# Patient Record
Sex: FEMALE | Race: WHITE | Employment: UNEMPLOYED | URBAN - METROPOLITAN AREA
[De-identification: names, ages, dates, MRNs, and addresses within clinical notes are randomized per-mention and may not be internally consistent; named-entity substitution may affect disease eponyms.]

---

## 2017-03-24 ENCOUNTER — HOSPITAL ENCOUNTER (OUTPATIENT)
Dept: ULTRASOUND IMAGING | Age: 42
Discharge: HOME | End: 2017-03-24
Attending: NURSE PRACTITIONER
Payer: COMMERCIAL

## 2017-03-24 DIAGNOSIS — R79.89 ELEVATED LIVER FUNCTION TESTS: ICD-10-CM

## 2017-03-24 PROCEDURE — 76705 ECHO EXAM OF ABDOMEN: CPT

## 2017-05-05 ENCOUNTER — HOSPITAL ENCOUNTER (OUTPATIENT)
Dept: MAMMOGRAPHY | Age: 42
Discharge: HOME | End: 2017-05-05
Attending: NURSE PRACTITIONER
Payer: COMMERCIAL

## 2017-05-05 ENCOUNTER — HOSPITAL ENCOUNTER (OUTPATIENT)
Dept: NUTRITION | Age: 42
Discharge: HOME | End: 2017-05-05
Attending: NURSE PRACTITIONER
Payer: COMMERCIAL

## 2017-05-05 DIAGNOSIS — Z12.31 VISIT FOR SCREENING MAMMOGRAM: ICD-10-CM

## 2017-05-05 DIAGNOSIS — K76.0 NON-ALCOHOLIC FATTY LIVER DISEASE: ICD-10-CM

## 2017-05-05 PROCEDURE — 77067 SCR MAMMO BI INCL CAD: CPT

## 2017-05-05 PROCEDURE — 97802 MEDICAL NUTRITION INDIV IN: CPT | Performed by: DIETITIAN, REGISTERED

## 2017-05-05 NOTE — PROGRESS NOTES
1211   Assessment - Initial Nutrition Evaluation   DATE: 2017      REFERRING PHYSICIAN: Ilya Barfield NP  NAME: Brandy Salgado : 1975 AGE: 39 y.o. GENDER: female  REASON FOR VISIT: CRUZ    Past Medical Hx: Crohns (colon resection), depression    LABS:   No results found for: HBA1C, HGBE8, JDP3PZJZ, KEH9RIXL    MEDICATIONS/SUPPLEMENTS:   [unfilled]  Prior to Admission medications    Medication Sig Start Date End Date Taking? Authorizing Provider   predniSONE (STERAPRED DS) 10 mg dose pack Take as follows  Day 1: 6 tabs PO -   Day 2: 5 tabs PO -   Day 3: 4 tabs PO -   Day 4: 3 tabs PO -   Day 5: 2 tabs PO -   Day 6: 1 tab PO 5/1/15   ALESSANDRO Kincaid   budesonide-formoterol (SYMBICORT) 160-4.5 mcg/Actuation HFA inhaler Take 2 Puffs by inhalation two (2) times a day. Historical Provider   albuterol (PROAIR HFA) 90 mcg/Actuation inhaler Take 1 Puff by inhalation. Historical Provider       FOOD ALLERGIES/INTOLERANCES: None    ANTHROPOMETRICS:    Ht Readings from Last 1 Encounters:   05/01/15 5' 3\" (1.6 m)      Wt Readings from Last 1 Encounters:   05/01/15 83.8 kg (184 lb 11.9 oz)   Today's weight: 192 lbs (87.3 kg)   IBW:115 # +/- 10%  %IBW: 167% +/- 10%    BMI: 34 Category: Obesity Class I    Reported Wt Hx:  Pt states having gained weight during last 2 pregnancies (in 3 years) and not losing wt gained; also attributes wt gain last year to use of prednisone.      Estimated Nutritional Needs:   0486-8203 kcal/day (MSJ x 1.2-1.3 - 500) to promote wt loss of 1lb per week; 50-67g protein (0.6-0.8g/kg)    Reported Diet Hx:  Pt eats 2 meals a day, mostly skipping bfast. Currently eating low fat, low carbohydrate diet   24 Hour Diet Recall  Breakfast  Skips or egg, toast   snack  egg   Lunch  Salad w/tuna or chicken or no protein, vinagarette dressing   snack  Egg or fruit   Dinner  [place] or soup or salad   Snacks  fruit   Beverages  Water or occasionally gingerale Exercise/Physical Actvity:  Pt doing cardiovascular exercise at home several times a week; enjoys going to the gym    Environmental/Social:  Pt works ptime as  in her home; lives with boyfriend and 4 children (1,3,5,16 yo); pt does most of cooking. Pt is currently on [facility]. NUTRITION DIAGNOSIS:  Food and nutrition-related deficit r/t healthy eating for obesity, CRUZ as evidenced by pt seeking nutritional information r/t weight loss and diagnosis of CRUZ. NUTRITION ASSESSMENT / INTERVENTION:  Pt states concern for \"fatty liver\" (pt has CRUZ) and obesity. Pt states wanting to lose weight (goal wt 145 lbs); motivator for weight loss is to improve liver function, have more energy to be able to play with her children, and to be able to run again. Reviewed pt current diet; states following low fat, low CHO diet for several months, but indicated stress eating at times; no alcohol use. Pt states having lost 15 lbs since January by changing diet, tracking food intake and increasing exercise. Notes having \"gotten off track\" for the past couple weeks since in-law visit. Recommended choosing foods low in saturated fat, no trans fat and adding healthy fats in diet (omega 3's). Reviewed healthy plate method (1/4 plate lean protein, 1/4 complex CHO, 1/2 non-starchy vegetables. Pt currently skipping bfast most days of the week; recommended pt consume 3 meals a day with snacks between if hungry. Provided pt with Healthy Plate handout and Healthy snack handout. Discussed keeping sugar low in diet and getting consistent exercise. Pt appears to be in the action stage of change. Will follow up with RD in 2-3 weeks. PATIENT GOALS: (Pt stated)    1. Eat 3 meals a day for next 3 weeks (no skipping) using healthy plate method  2. Keep food diary of meals/snacks on [employer]  3. Snacks to consist of lean protein/carbohydrate  4.  Exercise 3-5 days a week for at least 30 minutes; go to gym 2 days per week      Specific tips and techniques to facilitate compliance with above recommendations were provided and discussed. Pt was strongly encourage to begin making necessary changes now, and re-visit the dietitian in 2-3 weeks. If further details are desired please feel free to contact me at [phone]. This phone number was also provided to the patient for any further questions or concerns.             Elijah Traore RD

## 2017-06-18 ENCOUNTER — HOSPITAL ENCOUNTER (EMERGENCY)
Age: 42
Discharge: HOME | End: 2017-06-18
Attending: EMERGENCY MEDICINE
Payer: COMMERCIAL

## 2017-06-18 VITALS
WEIGHT: 167.55 LBS | HEIGHT: 66 IN | OXYGEN SATURATION: 98 % | TEMPERATURE: 98.4 F | RESPIRATION RATE: 18 BRPM | HEART RATE: 79 BPM | BODY MASS INDEX: 26.93 KG/M2

## 2017-06-18 DIAGNOSIS — H10.33 ACUTE CONJUNCTIVITIS OF BOTH EYES, UNSPECIFIED ACUTE CONJUNCTIVITIS TYPE: Primary | ICD-10-CM

## 2017-06-18 PROCEDURE — 99283 EMERGENCY DEPT VISIT LOW MDM: CPT

## 2017-06-18 RX ORDER — OFLOXACIN 3 MG/ML
SOLUTION/ DROPS OPHTHALMIC
Qty: 5 ML | Refills: 0 | Status: SHIPPED | OUTPATIENT
Start: 2017-06-18 | End: 2017-07-08

## 2017-06-18 RX ORDER — OFLOXACIN 3 MG/ML
1 SOLUTION/ DROPS OPHTHALMIC
Status: DISCONTINUED | OUTPATIENT
Start: 2017-06-18 | End: 2017-06-18 | Stop reason: HOSPADM

## 2017-06-18 NOTE — ED PROVIDER NOTES
HPI Comments: Kulwant Mccoy is a 39 y.o. female with PMhx significant for Chron's, asthma, and depression who presents ambulatory to the ED c/o an acute onset of BL eye pain, itching, and watery discharge x yesterday. She reports associated sx of a low grade fever. The pt discloses that she has had recent sick contact with her child who has pink eye noting she believes her sx are similar to pink eye. She endorses that she has been using Saline eye drops WNR of her sx leading her to the ED for further evaluation. The pt denies any trauma to the affected area. PCP: Karissa Serrato NP      There are no other complaints, changes or physical findings at this time. Written by KALYANI Morrisibjeremy, as dictated by FRANCO Tenorio    The history is provided by the patient. No  was used. Past Medical History:   Diagnosis Date    Asthma     Crohn's disease ([address]     Depression     Environmental allergies     Gastrointestinal disorder     Crohns    Migraine     Panic attacks     Unspecified vitamin D deficiency     Vitamin B12 deficiency        Past Surgical History:   Procedure Laterality Date    ABDOMEN SURGERY PROC UNLISTED      colon resection    HX APPENDECTOMY      HX OTHER SURGICAL  1999    terminal ileum resected    HX OTHER SURGICAL  1999    bladder reconstruction due to fistula    HX UROLOGICAL      bladder         Family History:   Problem Relation Age of Onset    Diabetes Mother     Other Sister      crohns in half sister    Other Maternal Aunt      crohns    Diabetes Maternal Grandmother     Diabetes Paternal Grandmother     Diabetes Paternal Grandfather        Social History     Social History    Marital status: SINGLE     Spouse name: N/A    Number of children: N/A    Years of education: N/A     Occupational History    Not on file.      Social History Main Topics    Smoking status: Former Smoker     Quit date: 2/4/2001    Smokeless tobacco: Never Used      Comment: quit 10 years ago--less than 5 cigs/day    Alcohol use Yes      Comment: very infrequent    Drug use: No    Sexual activity: Not on file     Other Topics Concern    Not on file     Social History Narrative    ** Merged History Encounter **         Lives in [place] with her boyfriend and 15 yo daughter and 4 yo son from a previous marriage. Works as  for [employer] ([employer] Likes to go to the gym and wants to get back into running. ALLERGIES: Latex; Latex; and Other medication    Review of Systems   Constitutional: Positive for fever (low grade). HENT: Negative. Eyes: Positive for pain (BL), discharge (BL) and itching (BL). Respiratory: Negative. Cardiovascular: Negative. Gastrointestinal: Negative. Negative for constipation, diarrhea, nausea and vomiting. Denies liver disease   Endocrine:        Denies thyroid disease   Genitourinary: Negative. Negative for dysuria. Denies kidney disease   Musculoskeletal: Negative. Skin: Negative. Neurological: Negative. All other systems reviewed and are negative. Vitals:    06/18/17 1745   Pulse: 79   Resp: 18   Temp: 98.4 °F (36.9 °C)   SpO2: 98%   Weight: 76 kg (167 lb 8.8 oz)   Height: 5' 6\" (1.676 m)            Physical Exam   Constitutional: She is oriented to person, place, and time. She appears well-developed and well-nourished. No distress. HENT:   Head: Normocephalic and atraumatic. Right Ear: External ear normal.   Left Ear: External ear normal.   Nose: Nose normal.   Mouth/Throat: Oropharynx is clear and moist. No oropharyngeal exudate. Eyes: EOM are normal. Pupils are equal, round, and reactive to light. Right eye exhibits no discharge and no exudate. Left eye exhibits no discharge. Right conjunctiva is injected. Left conjunctiva is injected. No scleral icterus. Neck: Normal range of motion. Neck supple.  No tracheal deviation present. Cardiovascular: Normal rate, regular rhythm, normal heart sounds and intact distal pulses. Exam reveals no gallop and no friction rub. No murmur heard. Pulmonary/Chest: Effort normal and breath sounds normal. No respiratory distress. She has no wheezes. She has no rales. She exhibits no tenderness. Abdominal: Soft. Bowel sounds are normal. She exhibits no distension and no mass. There is no tenderness. There is no rebound and no guarding. Musculoskeletal: She exhibits no edema or tenderness. Lymphadenopathy:     She has no cervical adenopathy. Neurological: She is alert and oriented to person, place, and time. No cranial nerve deficit. Skin: Skin is warm and dry. No rash noted. No erythema. Psychiatric: She has a normal mood and affect. Her behavior is normal.   Nursing note and vitals reviewed. MDM  Number of Diagnoses or Management Options  Acute conjunctivitis of both eyes, unspecified acute conjunctivitis type:   Diagnosis management comments: DDx: Bacterial vs. Viral Conjunctivitis. Amount and/or Complexity of Data Reviewed  Review and summarize past medical records: yes    Patient Progress  Patient progress: stable    ED Course       Procedures      MEDICATIONS GIVEN:  Medications   ofloxacin (FLOXIN) 0.3 % ophthalmic solution 1 Drop (not administered)       IMPRESSION:  1. Acute conjunctivitis of both eyes, unspecified acute conjunctivitis type        PLAN:  1. Current Discharge Medication List      START taking these medications    Details   ofloxacin (FLOXIN) 0.3 % ophthalmic solution Put 1-2 drops in both eyes four times a day for five days. Qty: 5 mL, Refills: 0         CONTINUE these medications which have NOT CHANGED    Details   budesonide-formoterol (SYMBICORT) 160-4.5 mcg/Actuation HFA inhaler Take 2 Puffs by inhalation two (2) times a day. albuterol (PROAIR HFA) 90 mcg/Actuation inhaler Take 1 Puff by inhalation.            2. Follow-up Information     Follow up With Details Comments [address] [facility], [address] [facility]  [address] [address] [phone]      [facility] EMERGENCY DEPT  If symptoms worsen [address] [facility] [address]   [address] [address] [phone]        3. Return to ED if worse   Discharge Note:  6:04 PM  The patient is ready for discharge. The patient's signs, symptoms, diagnosis, and discharge instruction have been discussed and the patient has conveyed their understanding. The patient is to follow up as recommended or return to the ER should their symptoms worsen. Plan has been discussed and the patient is in agreement. Written by Addison Don ED Scribe, as dictated by FRANCO Butler    Attestation: This note is prepared by Arline Goodpasture. Barrett, acting as Scribe for Rosalinda Fernández. FRANCO Butler: The scribe's documentation has been prepared under my direction and personally reviewed by me in its entirety. I confirm that the note above accurately reflects all work, treatment, procedures, and medical decision making performed by me.

## 2017-06-18 NOTE — DISCHARGE INSTRUCTIONS
Pinkeye: Care Instructions  Your Care Instructions    Pinkeye is redness and swelling of the eye surface and the conjunctiva (the lining of the eyelid and the covering of the white part of the eye). Pinkeye is also called conjunctivitis. Pinkeye is often caused by infection with bacteria or a virus. Dry air, allergies, smoke, and chemicals are other common causes. Pinkeye often clears on its own in 7 to 10 days. Antibiotics only help if the pinkeye is caused by bacteria. Pinkeye caused by infection spreads easily. If an allergy or chemical is causing pinkeye, it will not go away unless you can avoid whatever is causing it. Follow-up care is a key part of your treatment and safety. Be sure to make and go to all appointments, and call your doctor if you are having problems. Its also a good idea to know your test results and keep a list of the medicines you take. How can you care for yourself at home? · Wash your hands often. Always wash them before and after you treat pinkeye or touch your eyes or face. · Use moist cotton or a clean, wet cloth to remove crust. Wipe from the inside corner of the eye to the outside. Use a clean part of the cloth for each wipe. · Put cold or warm wet cloths on your eye a few times a day if the eye hurts. · Do not wear contact lenses or eye makeup until the pinkeye is gone. Throw away any eye makeup you were using when you got pinkeye. Clean your contacts and storage case. If you wear disposable contacts, use a new pair when your eye has cleared and it is safe to wear contacts again. · If the doctor gave you antibiotic ointment or eyedrops, use them as directed. Use the medicine for as long as instructed, even if your eye starts looking better soon. Keep the bottle tip clean, and do not let it touch the eye area. · To put in eyedrops or ointment:  ¨ Tilt your head back, and pull your lower eyelid down with one finger.   ¨ Drop or squirt the medicine inside the lower lid.  ¨ Close your eye for 30 to 60 seconds to let the drops or ointment move around. ¨ Do not touch the ointment or dropper tip to your eyelashes or any other surface. · Do not share towels, pillows, or washcloths while you have pinkeye. When should you call for help? Call your doctor now or seek immediate medical care if:  · You have pain in your eye, not just irritation on the surface. · You have a change in vision or loss of vision. · You have an increase in discharge from the eye. · Your eye has not started to improve or begins to get worse within 48 hours after you start using antibiotics. · Pinkeye lasts longer than 7 days. Watch closely for changes in your health, and be sure to contact your doctor if you have any problems. Where can you learn more? Go to http://sanjay-davis.info/. Enter Y392 in the search box to learn more about \"Pinkeye: Care Instructions. \"  Current as of: May 27, 2016  Content Version: 11.2  © 2009-9392 [employer]. Care instructions adapted under license by [employer] (which disclaims liability or warranty for this information). If you have questions about a medical condition or this instruction, always ask your healthcare professional. [address] any warranty or liability for your use of this information.

## 2017-07-08 ENCOUNTER — HOSPITAL ENCOUNTER (EMERGENCY)
Age: 42
Discharge: HOME | End: 2017-07-08
Attending: EMERGENCY MEDICINE
Payer: COMMERCIAL

## 2017-07-08 ENCOUNTER — APPOINTMENT (OUTPATIENT)
Dept: GENERAL RADIOLOGY | Age: 42
End: 2017-07-08
Attending: PHYSICIAN ASSISTANT
Payer: COMMERCIAL

## 2017-07-08 VITALS
SYSTOLIC BLOOD PRESSURE: 115 MMHG | OXYGEN SATURATION: 98 % | BODY MASS INDEX: 34.38 KG/M2 | HEART RATE: 78 BPM | WEIGHT: 194 LBS | DIASTOLIC BLOOD PRESSURE: 70 MMHG | RESPIRATION RATE: 18 BRPM | HEIGHT: 63 IN | TEMPERATURE: 98.3 F

## 2017-07-08 DIAGNOSIS — M25.551 PAIN OF RIGHT HIP JOINT: Primary | ICD-10-CM

## 2017-07-08 PROCEDURE — 74011250637 HC RX REV CODE- 250/637: Performed by: PHYSICIAN ASSISTANT

## 2017-07-08 PROCEDURE — 99283 EMERGENCY DEPT VISIT LOW MDM: CPT

## 2017-07-08 PROCEDURE — 73502 X-RAY EXAM HIP UNI 2-3 VIEWS: CPT

## 2017-07-08 PROCEDURE — 72220 X-RAY EXAM SACRUM TAILBONE: CPT

## 2017-07-08 PROCEDURE — 74011636637 HC RX REV CODE- 636/637: Performed by: PHYSICIAN ASSISTANT

## 2017-07-08 RX ORDER — PREDNISONE 20 MG/1
60 TABLET ORAL
Status: COMPLETED | OUTPATIENT
Start: 2017-07-08 | End: 2017-07-08

## 2017-07-08 RX ORDER — OXYCODONE AND ACETAMINOPHEN 5; 325 MG/1; MG/1
1 TABLET ORAL
Status: COMPLETED | OUTPATIENT
Start: 2017-07-08 | End: 2017-07-08

## 2017-07-08 RX ORDER — MONTELUKAST SODIUM 10 MG/1
TABLET ORAL DAILY
COMMUNITY
End: 2017-10-10

## 2017-07-08 RX ORDER — OXYCODONE AND ACETAMINOPHEN 5; 325 MG/1; MG/1
.5-1 TABLET ORAL
Qty: 12 TAB | Refills: 0 | Status: SHIPPED | OUTPATIENT
Start: 2017-07-08 | End: 2017-10-05

## 2017-07-08 RX ORDER — PREDNISONE 5 MG/1
TABLET ORAL
Qty: 21 TAB | Refills: 0 | Status: SHIPPED | OUTPATIENT
Start: 2017-07-08 | End: 2017-10-05

## 2017-07-08 RX ADMIN — OXYCODONE HYDROCHLORIDE AND ACETAMINOPHEN 1 TABLET: 5; 325 TABLET ORAL at 22:11

## 2017-07-08 RX ADMIN — PREDNISONE 60 MG: 20 TABLET ORAL at 22:11

## 2017-07-08 NOTE — LETTER
[address] 
[facility] EMERGENCY DEPT 
[address] [place] [address] [address] [id number] [phone] Work/School Note Date: 7/8/2017 To Whom It May concern: 
 
Ashlee Alberto was seen and treated today in the emergency room by the following provider(s): 
Attending Provider: Beryle Alstrom, DO Physician Assistant: ALESSANDRO Pelaez. Ashlee Alberto may return to work on 7/11/17 or sooner, if feeling better. Sincerely, ALESSANDRO Pelaez

## 2017-07-09 NOTE — ED NOTES
Assumed care of pt from triage. Pt presents to ED with chief complaint of right hip pain after running the 5k yesterday. Pt reports she stepped on uneven ground yesterday when running the 5k when she heard a \"pop\" in her right hip. Pt reports she has been having pain since, but denies any swelling. Pt is A&O x 4. Pt denies any other symptoms at this time. Pt resting comfortably on the stretcher in a position of comfort. Pt in no acute distress at this time. Call bell within reach. Side rails x 2. Stretcher locked in the lowest position. Pt aware of plan to await for MD/PA-C/NP assessment, and pt/family verbalizes understanding. Will continue to monitor.

## 2017-07-09 NOTE — ED NOTES
ALESSANDRO Guzman gave and reviewed discharge instructions with the patient. The patient verbalized understanding. The patient was given opportunity for questions. Patient discharged in stable condition to the waiting room.

## 2017-07-09 NOTE — ED PROVIDER NOTES
HPI Comments: Jhoan Augustine is a 39 y.o. female, who presents ambulatory to the ED c/o progressively worsening R hip pain that radiates around to her R lumbar area s/p running a 4K yesterday. Pt states she stepped on an uneven patch of grass while running. She reports taking Tylenol with no relief of sxs. Pt notes a hx of injury to her R hip. She specifically denies any recent fall, head trauma, or LOC, and is otherwise without complaint at this time. PCP: Delta Hope NP    PMHx: Significant for asthma, Crohn's, migraines, depression  PSHx: Significant for colon resection, appendectomy, bladder surgery, BL tubal ligation  Social Hx: -tobacco (former), -EtOH, -Illicit Drugs    There are no other complaints, changes, or physical findings at this time. The history is provided by the patient. Past Medical History:   Diagnosis Date    Asthma     Crohn's disease ([address]     Depression     Environmental allergies     Fatty liver     Gastrointestinal disorder     Crohns    Migraine     Panic attacks     Unspecified vitamin D deficiency     Vitamin B12 deficiency        Past Surgical History:   Procedure Laterality Date    ABDOMEN SURGERY PROC UNLISTED      colon resection    HX APPENDECTOMY      HX OTHER SURGICAL  1999    terminal ileum resected    HX OTHER SURGICAL  1999    bladder reconstruction due to fistula    HX UROLOGICAL      bladder         Family History:   Problem Relation Age of Onset    Diabetes Mother     Other Sister      crohns in half sister    Other Maternal Aunt      crohns    Diabetes Maternal Grandmother     Diabetes Paternal Grandmother     Diabetes Paternal Grandfather        Social History     Social History    Marital status: SINGLE     Spouse name: N/A    Number of children: N/A    Years of education: N/A     Occupational History    Not on file.      Social History Main Topics    Smoking status: Former Smoker     Quit date: 2/4/2001    Smokeless tobacco: Never Used      Comment: quit 10 years ago--less than 5 cigs/day    Alcohol use No    Drug use: No    Sexual activity: Not on file     Other Topics Concern    Not on file     Social History Narrative    ** Merged History Encounter **         Lives in [place] with her boyfriend and 15 yo daughter and 6 yo son from a previous marriage. Works as  for [employer] ([employer] Likes to go to the gym and wants to get back into running. ALLERGIES: Latex; Latex; and Other medication    Review of Systems   Constitutional: Negative. Negative for fever. HENT: Negative. Eyes: Negative. Respiratory: Negative. Cardiovascular: Negative. Gastrointestinal: Negative. Negative for abdominal pain, constipation, diarrhea, nausea and vomiting. Denies liver disease   Endocrine:        Denies thyroid disease   Genitourinary: Negative. Negative for dysuria, frequency and hematuria. Denies kidney disease   Musculoskeletal: Positive for arthralgias (R hip) and back pain (R lumbar). Skin: Negative. Neurological: Negative. All other systems reviewed and are negative. Vitals:    07/08/17 2058   BP: 115/70   Pulse: 78   Resp: 18   Temp: 98.3 °F (36.8 °C)   SpO2: 98%   Weight: 88 kg (194 lb 0.1 oz)   Height: 5' 3\" (1.6 m)            Physical Exam   Constitutional: She is oriented to person, place, and time. She appears well-developed and well-nourished. No distress. HENT:   Head: Normocephalic and atraumatic. Right Ear: External ear normal.   Left Ear: External ear normal.   Nose: Nose normal.   Mouth/Throat: Oropharynx is clear and moist. No oropharyngeal exudate. Eyes: Conjunctivae and EOM are normal. Pupils are equal, round, and reactive to light. Right eye exhibits no discharge. Left eye exhibits no discharge. No scleral icterus. Neck: Normal range of motion. Neck supple. No tracheal deviation present. Cardiovascular: Normal rate, regular rhythm, normal heart sounds and intact distal pulses. Exam reveals no gallop and no friction rub. No murmur heard. Pulmonary/Chest: Effort normal and breath sounds normal. No respiratory distress. She has no wheezes. She has no rales. She exhibits no tenderness. Abdominal: Soft. Bowel sounds are normal. She exhibits no distension and no mass. There is no tenderness. There is no rebound and no guarding. Musculoskeletal: She exhibits tenderness. She exhibits no edema or deformity. Tenderness anterior R hip; no contusions, no deformities  tenderness to palpation along the R SI joint; no contusions   Lymphadenopathy:     She has no cervical adenopathy. Neurological: She is alert and oriented to person, place, and time. No cranial nerve deficit. Skin: Skin is warm and dry. No rash noted. No erythema. Psychiatric: She has a normal mood and affect. Her behavior is normal.   Nursing note and vitals reviewed. Written by Brisa Tidwell, ED Scribe, as dictated by Jori MARTIN  Number of Diagnoses or Management Options  Pain of right hip joint:   Diagnosis management comments:   DDx: arthritis, sprain, strain, fracture, AVN       Amount and/or Complexity of Data Reviewed  Tests in the radiology section of CPT®: reviewed and ordered  Review and summarize past medical records: yes  Independent visualization of images, tracings, or specimens: yes    Patient Progress  Patient progress: stable      Procedures    Progress note:  10:37 PM  Pt noted to be feeling better, ready for discharge. Updated pt and/or family on all final imaging findings. Will follow up as instructed. All questions have been answered, pt voiced understanding and agreement with plan. Narcotics were prescribed, pt was advised not to drive or operate heavy machinery. Specific return precautions provided as well as instructions to return to the ED should sx worsen at any time.  Vital signs stable for discharge. Written by Rance Cooks, ED Scribe, as dictated by Fozia Montano       IMAGING RESULTS:  XR HIP RT W OR WO PELV 2-3 VWS     EXAM:  XR HIP RT W OR WO PELV 2-3 VWS     INDICATION:   Right hip pain after running a 4K race yesterday.     COMPARISON: Pelvis view on 7/6/2010.     FINDINGS: An AP view of the pelvis and a frogleg lateral view of the right hip  demonstrate no fracture, dislocation or other acute abnormality. Joints are  within normal limits. Bone mineralization is within normal limits.     IMPRESSION  IMPRESSION:       Normal right hip views. No change. XR SACRUM AND COCCYX    EXAM:  XR SACRUM AND COCCYX     INDICATION: Sacral and right hip pain after running a 4K yesterday.     COMPARISON: Lumbar spine views on 5/1/2015.     FINDINGS: 3 images of AP and lateral views of the sacrum and coccyx demonstrate  no fracture or other acute abnormality. Sacral iliac joints are within normal  limits.     IMPRESSION  IMPRESSION:      Normal sacral views. No change. MEDICATIONS GIVEN:  Medications   predniSONE (DELTASONE) tablet 60 mg (60 mg Oral Given 7/8/17 2211)   oxyCODONE-acetaminophen (PERCOCET) 5-325 mg per tablet 1 Tab (1 Tab Oral Given 7/8/17 2211)       IMPRESSION:  1. Pain of right hip joint        PLAN:  1. Current Discharge Medication List      START taking these medications    Details   oxyCODONE-acetaminophen (PERCOCET) 5-325 mg per tablet Take 0.5-1 Tabs by mouth every six (6) hours as needed for Pain. Max Daily Amount: 4 Tabs. Qty: 12 Tab, Refills: 0      predniSONE (STERAPRED) 5 mg dose pack See administration instruction per 5mg dose pack  Qty: 21 Tab, Refills: 0           2.    Follow-up Information     Follow up With Details Comments [facility], [address]  [phone] [id number] [facility], DO  bone and joint specialist, if no improvement Maryse Serrano [address]  [phone]          Return to ED if worse     DISCHARGE NOTE:  10:38 PM  The patient's results have been reviewed with family and/or caregiver. They verbally convey their understanding and agreement of the patient's signs, symptoms, diagnosis, treatment, and prognosis. They additionally agree to follow up as recommended in the discharge instructions or to return to the Emergency Room should the patient's condition change prior to their follow-up appointment. The family and/or caregiver verbally agrees with the care-plan and all of their questions have been answered. The discharge instructions have also been provided to the them along with educational information regarding the patient's diagnosis and a list of reasons why the patient would want to return to the ER prior to their follow-up appointment should their condition change. Written by Horace Treviño, ED Scribe, as dictated by [employer]. This note is prepared by Horace Treviño, acting as Scribe for FRANCO Scott  : The scribe's documentation has been prepared under my direction and personally reviewed by me in its entirety. I confirm that the note above accurately reflects all work, treatment, procedures, and medical decision making performed by me. This note will not be viewable in [address]

## 2017-07-09 NOTE — DISCHARGE INSTRUCTIONS
Hip Pain: Care Instructions  Your Care Instructions  Hip pain may be caused by many things, including overuse, a fall, or a twisting movement. Another cause of hip pain is arthritis. Your pain may increase when you stand up, walk, or squat. The pain may come and go or may be constant. Home treatment can help relieve hip pain, swelling, and stiffness. If your pain is ongoing, you may need more tests and treatment. Follow-up care is a key part of your treatment and safety. Be sure to make and go to all appointments, and call your doctor if you are having problems. Its also a good idea to know your test results and keep a list of the medicines you take. How can you care for yourself at home? · Take pain medicines exactly as directed. ¨ If the doctor gave you a prescription medicine for pain, take it as prescribed. ¨ If you are not taking a prescription pain medicine, ask your doctor if you can take an over-the-counter medicine. · Rest and protect your hip. Take a break from any activity, including standing or walking, that may cause pain. · Put ice or a cold pack against your hip for 10 to 20 minutes at a time. Try to do this every 1 to 2 hours for the next 3 days (when you are awake) or until the swelling goes down. Put a thin cloth between the ice and your skin. · Sleep on your healthy side with a pillow between your knees, or sleep on your back with pillows under your knees. · If there is no swelling, you can put moist heat, a heating pad, or a warm cloth on your hip. Do gentle stretching exercises to help keep your hip flexible. · Learn how to prevent falls. Have your vision and hearing checked regularly. Wear slippers or shoes with a nonskid sole. · Stay at a healthy weight. · Wear comfortable shoes. When should you call for help? Call 911 anytime you think you may need emergency care. For example, call if:  · You have sudden chest pain and shortness of breath, or you cough up blood.   · You are not able to stand or walk or bear weight. · Your buttocks, legs, or feet feel numb or tingly. · Your leg or foot is cool or pale or changes color. · You have severe pain. Call your doctor now or seek immediate medical care if:  · You have signs of infection, such as:  ¨ Increased pain, swelling, warmth, or redness in the hip area. ¨ Red streaks leading from the hip area. ¨ Pus draining from the hip area. ¨ A fever. · You have signs of a blood clot, such as:  ¨ Pain in your calf, back of the knee, thigh, or groin. ¨ Redness and swelling in your leg or groin. · You are not able to bend, straighten, or move your leg normally. · You have trouble urinating or having bowel movements. Watch closely for changes in your health, and be sure to contact your doctor if:  · You do not get better as expected. Where can you learn more? Go to http://sanjay-davis.info/. Enter V547 in the search box to learn more about \"Hip Pain: Care Instructions. \"  Current as of: March 20, 2017  Content Version: 11.3  © 3210-6251 [employer]. Care instructions adapted under license by [employer] (which disclaims liability or warranty for this information). If you have questions about a medical condition or this instruction, always ask your healthcare professional. [place] [address] any warranty or liability for your use of this information.

## 2017-10-05 ENCOUNTER — APPOINTMENT (OUTPATIENT)
Dept: GENERAL RADIOLOGY | Age: 42
End: 2017-10-05
Attending: EMERGENCY MEDICINE
Payer: COMMERCIAL

## 2017-10-05 ENCOUNTER — HOSPITAL ENCOUNTER (EMERGENCY)
Age: 42
Discharge: HOME | End: 2017-10-05
Attending: EMERGENCY MEDICINE
Payer: COMMERCIAL

## 2017-10-05 VITALS
HEIGHT: 63 IN | SYSTOLIC BLOOD PRESSURE: 117 MMHG | OXYGEN SATURATION: 97 % | TEMPERATURE: 98 F | BODY MASS INDEX: 35.23 KG/M2 | HEART RATE: 77 BPM | WEIGHT: 198.85 LBS | RESPIRATION RATE: 16 BRPM | DIASTOLIC BLOOD PRESSURE: 77 MMHG

## 2017-10-05 DIAGNOSIS — S63.92XA HAND SPRAIN, LEFT, INITIAL ENCOUNTER: Primary | ICD-10-CM

## 2017-10-05 PROCEDURE — 74011250637 HC RX REV CODE- 250/637: Performed by: PHYSICIAN ASSISTANT

## 2017-10-05 PROCEDURE — 73130 X-RAY EXAM OF HAND: CPT

## 2017-10-05 PROCEDURE — 99283 EMERGENCY DEPT VISIT LOW MDM: CPT

## 2017-10-05 RX ORDER — HYDROCODONE BITARTRATE AND ACETAMINOPHEN 5; 325 MG/1; MG/1
1 TABLET ORAL
Status: COMPLETED | OUTPATIENT
Start: 2017-10-05 | End: 2017-10-05

## 2017-10-05 RX ORDER — HYDROCODONE BITARTRATE AND ACETAMINOPHEN 5; 325 MG/1; MG/1
1 TABLET ORAL
Qty: 16 TAB | Refills: 0 | Status: SHIPPED | OUTPATIENT
Start: 2017-10-05 | End: 2018-03-28 | Stop reason: ALTCHOICE

## 2017-10-05 RX ADMIN — HYDROCODONE BITARTRATE AND ACETAMINOPHEN 1 TABLET: 5; 325 TABLET ORAL at 18:09

## 2017-10-05 NOTE — ED TRIAGE NOTES
Shayne Stewart and landed on both hands. Her left hand is very tender and she can't move her fingers well.

## 2017-10-05 NOTE — DISCHARGE INSTRUCTIONS
Hand Sprain: Care Instructions  Your Care Instructions  A hand sprain occurs when you stretch or tear a ligament in your hand. Ligaments are the tough tissues that connect one bone to another. Most hand sprains will heal with treatment you can do at home. Follow-up care is a key part of your treatment and safety. Be sure to make and go to all appointments, and call your doctor if you are having problems. It's also a good idea to know your test results and keep a list of the medicines you take. How can you care for yourself at home? · If your doctor gave you a splint or immobilizer, wear it as directed. This will help keep swelling down and help your hand heal.  · Follow your doctor's directions for exercise and other activity. · For the first 2 days after your injury, avoid things that might increase swelling, such as hot showers, hot tubs, or hot packs. · Put ice or a cold pack on your hand for 10 to 20 minutes at a time to stop swelling. Try this every 1 to 2 hours for 3 days (when you are awake) or until the swelling goes down. Put a thin cloth between the ice pack and your skin. Keep your splint dry. · After 2 or 3 days, if your swelling is gone, put a heating pad (set on low) or a warm cloth on your hand. Some experts suggest that you go back and forth between hot and cold treatments. · Prop up your hand on a pillow when you ice it or anytime you sit or lie down. Try to keep it above the level of your heart. This will help reduce swelling. · Take pain medicines exactly as directed. ¨ If the doctor gave you a prescription medicine for pain, take it as prescribed. ¨ If you are not taking a prescription pain medicine, ask your doctor if you can take an over-the-counter medicine. · Return to your usual level of activity slowly. When should you call for help? Call your doctor now or seek immediate medical care if:  · Your pain is worse. · You have new or increased swelling in your hand.   · You cannot move your hand. · You have tingling, weakness, or numbness in your hand or fingers. · Your hand or fingers are cool or pale or change color. · You have a fever. · Your hand or fingers are red. Watch closely for changes in your health, and be sure to contact your doctor if:  · Your hand does not get better as expected. Where can you learn more? Go to http://sanjay-davis.info/. Enter M596 in the search box to learn more about \"Hand Sprain: Care Instructions. \"  Current as of: March 21, 2017  Content Version: 11.3  © 1663-2587 [employer]. Care instructions adapted under license by [employer] (which disclaims liability or warranty for this information). If you have questions about a medical condition or this instruction, always ask your healthcare professional. [address] any warranty or liability for your use of this information.

## 2017-10-05 NOTE — ED NOTES
.Discharge instructions reviewed with patient and given to pt per PA Otelia Members. Pt able to return/verbalize discharge instructions. Copy of discharge instructions given. RX given to pt. Pt condition stable, no further complaints. Ambulatory, steady gait. Wheelchair offered & pt declined. Patient out of ED prior to obtaining discharge vitals. Belongings & discharge paperwork out of ED with patient.

## 2017-10-05 NOTE — ED PROVIDER NOTES
[address]  EMERGENCY DEPARTMENT HISTORY AND PHYSICAL EXAM       Date of Service: 10/5/2017   Patient Name: Zara Miles   YOB: 1975  Medical Record Number: [id number]    History of Presenting Illness     Chief Complaint   Patient presents with    Fall    Hand Pain     left         History Provided By:  patient    Additional History:     Zara Miles is a 43 y.o. L handed female, who presents ambulatory to the ED c/o sudden onset L hand pain s/p GLF x 1430 this afternoon. 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 She notes she was ambulating when she tripped over one of the kids, and fell forward landing on her outstretched hands. She notes difficulty with gripping objects in her L hand following the accident. Pt denies any recent medications for her current sxs. She denies any hx of orthopedic injury to the affected ankle. Pt specifically denies any recent head injury, LOC, fevers, chills, nausea, vomiting, diarrhea, abd pain, CP, SOB, urinary sxs, changes in BM, or headache. PMHx: Significant for Crohn's disease, asthma, depression, panic attacks, migraines  PSHx: Significant for colectomy, appendectomy, terminal ileum resection, bladder reconstruction  Social Hx: -tobacco (former 5921), -EtOH, -Illicit Drugs    There are no other complaints, changes, or physical findings at this time.      Primary Care Provider: Henrietta Boyd NP       Past History       Past Medical History:   Past Medical History:   Diagnosis Date    Asthma     [facility]'s disease ([facility] [address]     Depression     Environmental allergies     Fatty liver     Gastrointestinal disorder     Crohns    Migraine     Panic attacks     Unspecified vitamin D deficiency     Vitamin B12 deficiency         Past Surgical History:   Past Surgical History:   Procedure Laterality Date    ABDOMEN SURGERY PROC UNLISTED      colon resection    HX APPENDECTOMY      [facility] terminal ileum resected    HX OTHER SURGICAL  1999    bladder reconstruction due to fistula    HX UROLOGICAL      bladder        Family History:   Family History   Problem Relation Age of Onset    Diabetes Mother     Other Sister      crohns in half sister    Other Maternal Aunt      crohns    Diabetes Maternal Grandmother     Diabetes Paternal Grandmother     Diabetes Paternal Grandfather         Social History:   Social History   Substance Use Topics    Smoking status: Former Smoker     Quit date: 2/4/2001    Smokeless tobacco: Never Used      Comment: quit 10 years ago--less than 5 cigs/day    Alcohol use No        Allergies: Allergies   Allergen Reactions    Latex Shortness of Breath and Rash    Latex Rash    Other Medication Rash     abdhesive tape, told not to take antibiotics          Review of Systems     Review of Systems   Constitutional: Negative. Negative for fever. HENT: Negative. Eyes: Negative. Respiratory: Negative. Cardiovascular: Negative. Gastrointestinal: Negative. Negative for constipation, diarrhea, nausea and vomiting. Denies liver disease   Endocrine:        Denies thyroid disease   Genitourinary: Negative. Negative for dysuria. Denies kidney disease   Musculoskeletal: Positive for arthralgias (R hand). Skin: Negative. Neurological: Negative. All other systems reviewed and are negative. Physical Exam    Physical Exam   Constitutional: She is oriented to person, place, and time. She appears well-developed and well-nourished. No distress. HENT:   Head: Normocephalic and atraumatic. Right Ear: External ear normal.   Left Ear: External ear normal.   Nose: Nose normal.   Mouth/Throat: Oropharyngeal exudate present. Eyes: Conjunctivae and EOM are normal. Pupils are equal, round, and reactive to light. Right eye exhibits no discharge. Left eye exhibits no discharge. No scleral icterus. Neck: Normal range of motion. Neck supple.  No tracheal deviation present. Cardiovascular: Normal rate, regular rhythm, normal heart sounds and intact distal pulses. Exam reveals no gallop and no friction rub. No murmur heard. Pulmonary/Chest: Effort normal and breath sounds normal. No respiratory distress. She has no wheezes. She has no rales. She exhibits no tenderness. Abdominal: Soft. Bowel sounds are normal. She exhibits no distension and no mass. There is no tenderness. There is no rebound and no guarding. Musculoskeletal: She exhibits tenderness. She exhibits no edema. Ambulatory without difficulty  No bony tenderness to palpation to the spine or neck  Tenderness to the dorsal aspect of the L hand; no contusions, no deformity  Neurovascularly intact distally  Capillary refill less than 3 seconds  Decreased ROM secondary to pain. Lymphadenopathy:     She has no cervical adenopathy. Neurological: She is alert and oriented to person, place, and time. No cranial nerve deficit. Skin: Skin is warm and dry. No rash noted. No erythema. Psychiatric: She has a normal mood and affect. Her behavior is normal.   Nursing note and vitals reviewed. Provider Notes / MDM:     DDx: sprain, strain, fracture, contusion, fall       Medical Decision Making    I am the first provider for this patient. I reviewed the vital signs, available nursing notes, past medical history, past surgical history, family history and social history. Old Medical Records: Old medical records. Nursing notes. ED Course:   6:41 PM   Initial assessment performed. The patients presenting problems have been discussed, and they are in agreement with the care plan formulated and outlined with them. I have encouraged them to ask questions as they arise throughout their visit. Progress note:  7:23 PM  Pt noted to be feeling better, texting on her phone in bed, ready for discharge. Updated pt and/or family on all final imaging findings.   Will follow up as instructed. All questions have been answered, pt voiced understanding and agreement with plan. Narcotics were prescribed, pt was advised not to drive or operate heavy machinery. Specific return precautions provided as well as instructions to return to the ED should sx worsen at any time. Vital signs stable for discharge. Written by Wing Thompson ED Scribe, as dictated by [employer]    Procedures:     Procedure Note - Ace Wrap Placement:  7:09 PM  Performed by: Neoma Shows  Neurovascularly intact prior to tx. An Ace Wrap was placed on pt's left wrist.  Joint was placed in neutral position. Neurovascularly intact after tx. The procedure took 1-15 minutes, and pt tolerated well. Written by KALYANI Caiibe, as dictated by [employer]Now Shows      Diagnostic Study Results      Radiology - The following have been ordered and reviewed:    XR HAND LT MIN 3 V     EXAM:  XR HAND LT MIN 3 V     INDICATION:  possible fracture.     COMPARISON: None.     FINDINGS: Three views of the left hand demonstrate no fracture, dislocation or  other acute osseous or articular abnormality. The soft tissues are within  normal limits.     IMPRESSION  IMPRESSION:  No acute abnormality. Vital Signs - Reviewed the patient's vital signs. Patient Vitals for the past 12 hrs:   Temp Pulse Resp BP SpO2   10/05/17 1800 98 °F (36.7 °C) 77 16 117/77 97 %       Medications Given in the ED:    Medications   HYDROcodone-acetaminophen (NORCO) 5-325 mg per tablet 1 Tab (1 Tab Oral Given 10/5/17 1809)         Diagnosis:  Clinical Impression:     1. Hand sprain, left, initial encounter         Plan:  1.    Follow-up Information     Follow up With Details Comments [facility], [place] [address]  [address] [facility]  hand specialist, if no improvement [address]  [phone]      [facility] EMERGENCY DEPT  If symptoms worsen [address] [place] [address]  [phone]          2. Current Discharge Medication List      START taking these medications    Details   HYDROcodone-acetaminophen (NORCO) 5-325 mg per tablet Take 1 Tab by mouth every six (6) hours as needed for Pain. Max Daily Amount: 4 Tabs. Qty: 16 Tab, Refills: 0         STOP taking these medications       oxyCODONE-acetaminophen (PERCOCET) 5-325 mg per tablet Comments:   Reason for Stopping:             Return to ED if worse. Disposition:    DISCHARGE NOTE:  7:23 PM  The patient's results have been reviewed with family and/or caregiver. They verbally convey their understanding and agreement of the patient's signs, symptoms, diagnosis, treatment, and prognosis. They additionally agree to follow up as recommended in the discharge instructions or to return to the Emergency Room should the patient's condition change prior to their follow-up appointment. The family and/or caregiver verbally agrees with the care-plan and all of their questions have been answered. The discharge instructions have also been provided to the them along with educational information regarding the patient's diagnosis and a list of reasons why the patient would want to return to the ER prior to their follow-up appointment should their condition change. Written by Dom Montaño, ED Scribe, as dictated by Marli High. This note is prepared by Dom Montaño, acting as Scribe for FRANCO De La Rosa : The scribe's documentation has been prepared under my direction and personally reviewed by me in its entirety. I confirm that the note above accurately reflects all work, treatment, procedures, and medical decision making performed by me. This note will not be viewable in [address]

## 2017-10-09 ENCOUNTER — TELEPHONE (OUTPATIENT)
Dept: INTERNAL MEDICINE CLINIC | Age: 42
End: 2017-10-09

## 2017-10-09 NOTE — TELEPHONE ENCOUNTER
Kwan Lynch called the office stating she was having horrible respiratory symptoms. 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 She is out of her albuterol & is requesting Richard to call her to discuss her options since she can't come in to the office.   [phone])

## 2017-10-10 ENCOUNTER — OFFICE VISIT (OUTPATIENT)
Dept: INTERNAL MEDICINE CLINIC | Age: 42
End: 2017-10-10

## 2017-10-10 VITALS
SYSTOLIC BLOOD PRESSURE: 115 MMHG | WEIGHT: 200 LBS | HEIGHT: 63 IN | BODY MASS INDEX: 35.44 KG/M2 | TEMPERATURE: 98.7 F | DIASTOLIC BLOOD PRESSURE: 81 MMHG | OXYGEN SATURATION: 96 % | HEART RATE: 84 BPM

## 2017-10-10 DIAGNOSIS — J45.20 MILD INTERMITTENT ASTHMATIC BRONCHITIS WITHOUT COMPLICATION: Primary | ICD-10-CM

## 2017-10-10 PROBLEM — Z00.00 ANNUAL PHYSICAL EXAM: Status: ACTIVE | Noted: 2017-10-10

## 2017-10-10 PROBLEM — J45.909 ASTHMA: Status: ACTIVE | Noted: 2017-10-10

## 2017-10-10 PROBLEM — Z13.29 SCREENING FOR HYPOTHYROIDISM: Status: ACTIVE | Noted: 2017-10-10

## 2017-10-10 PROBLEM — Z13.820 OSTEOPOROSIS SCREENING: Status: ACTIVE | Noted: 2017-10-10

## 2017-10-10 PROBLEM — Z13.1 SCREENING FOR DIABETES MELLITUS: Status: ACTIVE | Noted: 2017-10-10

## 2017-10-10 PROBLEM — Z13.220 SCREENING FOR HYPERLIPIDEMIA: Status: ACTIVE | Noted: 2017-10-10

## 2017-10-10 PROBLEM — K50.90 CROHN DISEASE (HCC): Status: ACTIVE | Noted: 2017-10-10

## 2017-10-10 PROBLEM — E53.8 VITAMIN B 12 DEFICIENCY: Status: ACTIVE | Noted: 2017-10-10

## 2017-10-10 PROBLEM — E55.9 VITAMIN D DEFICIENCY: Status: ACTIVE | Noted: 2017-10-10

## 2017-10-10 PROBLEM — R79.89 ELEVATED LFTS: Status: ACTIVE | Noted: 2017-10-10

## 2017-10-10 PROBLEM — M79.673 FOOT PAIN: Status: ACTIVE | Noted: 2017-10-10

## 2017-10-10 RX ORDER — FLUTICASONE PROPIONATE 50 MCG
2 SPRAY, SUSPENSION (ML) NASAL DAILY
COMMUNITY

## 2017-10-10 RX ORDER — CEFUROXIME AXETIL 500 MG/1
500 TABLET ORAL 2 TIMES DAILY
Qty: 20 TAB | Refills: 0 | Status: SHIPPED | OUTPATIENT
Start: 2017-10-10 | End: 2018-10-08 | Stop reason: ALTCHOICE

## 2017-10-10 RX ORDER — CEFUROXIME AXETIL 500 MG/1
TABLET ORAL 2 TIMES DAILY
COMMUNITY
End: 2017-10-10 | Stop reason: ALTCHOICE

## 2017-10-10 RX ORDER — NEBULIZER AND COMPRESSOR
EACH MISCELLANEOUS
Qty: 1 EACH | Refills: 0 | Status: SHIPPED | OUTPATIENT
Start: 2017-10-10 | End: 2017-10-10 | Stop reason: SDUPTHER

## 2017-10-10 RX ORDER — NEBULIZER AND COMPRESSOR
EACH MISCELLANEOUS
Qty: 1 EACH | Refills: 0 | Status: SHIPPED | OUTPATIENT
Start: 2017-10-10

## 2017-10-10 RX ORDER — CEFUROXIME AXETIL 500 MG/1
500 TABLET ORAL 2 TIMES DAILY
Qty: 20 TAB | Refills: 0 | Status: SHIPPED | OUTPATIENT
Start: 2017-10-10 | End: 2017-10-10 | Stop reason: SDUPTHER

## 2017-10-10 RX ORDER — ALBUTEROL SULFATE 90 UG/1
2 AEROSOL, METERED RESPIRATORY (INHALATION)
Qty: 1 INHALER | Refills: 3 | Status: SHIPPED | OUTPATIENT
Start: 2017-10-10 | End: 2018-03-28 | Stop reason: SDUPTHER

## 2017-10-10 RX ORDER — IPRATROPIUM BROMIDE AND ALBUTEROL SULFATE 2.5; .5 MG/3ML; MG/3ML
3 SOLUTION RESPIRATORY (INHALATION)
Qty: 1 NEBULE | Refills: 0 | Status: SHIPPED | OUTPATIENT
Start: 2017-10-10 | End: 2017-10-10 | Stop reason: CLARIF

## 2017-10-10 RX ORDER — ALBUTEROL SULFATE 0.83 MG/ML
2.5 SOLUTION RESPIRATORY (INHALATION)
Qty: 1 PACKAGE | Refills: 3 | Status: SHIPPED | OUTPATIENT
Start: 2017-10-10 | End: 2017-10-10 | Stop reason: SDUPTHER

## 2017-10-10 RX ORDER — ALBUTEROL SULFATE 90 UG/1
2 AEROSOL, METERED RESPIRATORY (INHALATION)
Qty: 1 INHALER | Refills: 3 | Status: SHIPPED | OUTPATIENT
Start: 2017-10-10 | End: 2017-10-10 | Stop reason: SDUPTHER

## 2017-10-10 RX ORDER — METHYLPREDNISOLONE 4 MG/1
TABLET ORAL
COMMUNITY
End: 2017-10-10 | Stop reason: ALTCHOICE

## 2017-10-10 RX ORDER — ALBUTEROL SULFATE 0.83 MG/ML
2.5 SOLUTION RESPIRATORY (INHALATION)
Qty: 1 PACKAGE | Refills: 3 | Status: SHIPPED | OUTPATIENT
Start: 2017-10-10 | End: 2018-03-28 | Stop reason: SDUPTHER

## 2017-10-10 RX ORDER — LOPERAMIDE HYDROCHLORIDE 2 MG/1
CAPSULE ORAL
COMMUNITY
End: 2017-10-10 | Stop reason: ALTCHOICE

## 2017-10-10 NOTE — PROGRESS NOTES
Subjective:  Ms. Beatriz Nicole is a pleasant 43year old lady who comes in today with her young son in attendance. She does give a one week history of some nasal congestion, postnasal drainage and now a productive cough of yellowish to greenish sputum. She does have a history of asthma, for which she is prophylactically on Symbicort 80/4.5, two inhalations twice daily. She is also managed on Singulair 10 mg daily. In the past five days she's had increasing wheezing and shortness of breath. She's had chills and fever. She did have some mild nausea, but denies any fever.

## 2017-10-10 NOTE — PROGRESS NOTES
Physical Examination:  GENERAL:  On exam she is a pleasant lady in no acute distress. She is alert and oriented. VITALS:  BP: 115/81. P: 84 and regular. R: 16.  T: 98.7. O2 sat: 96. HEENT:  Normocephalic, atraumatic. PERRLA, EOMI. TMs normal.  Mouth mucosa pink. Tongue midline. Pharynx normal.  No sinus tenderness. NECK:  Supple without adenopathy. CHEST:  Lungs were clear except for occasional expiratory wheeze. No rales. Good chest excursion. No use of accessory muscles. CARDIAC:  Heart regular rhythm without murmur or gallop. EXTREMITIES:  No edema or calf tenderness. Distal pulses were present. Impression:  1. Acute asthmatic bronchitis. Plan:  1. It was opted to start her on Ceftin 500 mg twice daily for ten days. 2. She already had started some prednisone that she keeps at home for Crohn's disease. She already took 60 mg today. So I have asked her to gradually over the next five days decrease by 10 mg daily until she has completed. 3. She is to increase fluids and rest.  4. I also renewed her ProAir and also her nebulizer machine. 5. She will contact us should she fail to improve or should her condition worsen.

## 2017-10-10 NOTE — PATIENT INSTRUCTIONS

## 2017-10-10 NOTE — PROGRESS NOTES
Luis Armando Rodriguez presents with   Chief Complaint   Patient presents with    Cough    Shortness of Breath    Wheezing   Patient here with complaint of cough, shortness of breath, wheezing, nasal drainage, chills & fever since yesterday. She is out of most of her asthma medications & would like refills. 1. Have you been to the ER, urgent care clinic since your last visit? Hospitalized since your last visit? No    2. Have you seen or consulted any other health care providers outside of the [address] [place] [address] since your last visit? Include any pap smears or colon screening.  No

## 2017-10-10 NOTE — MR AVS SNAPSHOT
Visit Information Date & Time Provider Department Dept. Phone Encounter #  
 10/10/2017 11:00 AM Amberly Anguiano NP [address] [place] [address] [phone] [id number] Follow-up Instructions Return if symptoms worsen or fail to improve. Upcoming Health Maintenance Date Due DTaP/Tdap/Td series (1 - Tdap) 9/11/1996 PAP AKA CERVICAL CYTOLOGY 9/11/1996 INFLUENZA AGE 9 TO ADULT 8/1/2017 Allergies as of 10/10/2017  Review Complete On: 10/10/2017 By: Amberly Anguiano NP Severity Noted Reaction Type Reactions Latex Medium 07/02/2010   Systemic Shortness of Breath, Rash Latex  06/14/2010    Rash Other Medication Medium 07/02/2010   Side Effect Rash  
 abdhesive tape, told not to take antibiotics Current Immunizations  Never Reviewed No immunizations on file. Not reviewed this visit You Were Diagnosed With   
  
 Codes Comments Mild intermittent asthmatic bronchitis without complication    -  Primary ICD-10-CM: J45.20 ICD-9-CM: 493.90 Vitals BP Pulse Temp Height(growth percentile) Weight(growth percentile) SpO2  
 115/81 (BP 1 Location: Left arm, BP Patient Position: Sitting) 84 98.7 °F (37.1 °C) 5' 3\" (1.6 m) 200 lb (90.7 kg) 96% BMI OB Status Smoking Status 35.43 kg/m2 Having regular periods Former Smoker Vitals History BMI and BSA Data Body Mass Index Body Surface Area  
 35.43 kg/m 2 2.01 m 2 Preferred Pharmacy Pharmacy Name Phone [facility] [phone] Your Updated Medication List  
  
   
This list is accurate as of: 10/10/17 12:29 PM.  Always use your most recent med list.  
  
  
  
  
 * albuterol 2.5 mg /3 mL (0.083 %) nebulizer solution Commonly known as:  PROVENTIL VENTOLIN  
3 mL by Nebulization route every four (4) hours as needed for Wheezing. * albuterol 90 mcg/actuation inhaler Commonly known as:  PROAIR HFA Take 2 Puffs by inhalation every four (4) hours as needed for Wheezing. cefUROXime 500 mg tablet Commonly known as:  CEFTIN Take 1 Tab by mouth two (2) times a day. FLONASE 50 mcg/actuation nasal spray Generic drug:  fluticasone 2 Sprays by Both Nostrils route daily. HYDROcodone-acetaminophen 5-325 mg per tablet Commonly known as:  David Andersen Take 1 Tab by mouth every six (6) hours as needed for Pain. Max Daily Amount: 4 Tabs. Nebulizer & Compressor machine Patient already have instructions how to use. SYMBICORT 160-4.5 mcg/actuation Hfaa Generic drug:  budesonide-formoterol Take 2 Puffs by inhalation two (2) times a day. VITAMIN D2 PO Take  by mouth. * Notice: This list has 2 medication(s) that are the same as other medications prescribed for you. Read the directions carefully, and ask your doctor or other care provider to review them with you. Prescriptions Sent to Pharmacy Refills  
 cefUROXime (CEFTIN) 500 mg tablet 0 Sig: Take 1 Tab by mouth two (2) times a day. Class: Normal  
 Pharmacy: [facility]  Ph #: [phone] Route: Oral  
 Nebulizer & Compressor machine 0 Sig: Patient already have instructions how to use. Class: Normal  
 Pharmacy: [facility]  Ph #: [phone]  
 albuterol (PROVENTIL VENTOLIN) 2.5 mg /3 mL (0.083 %) nebulizer solution 3 Sig: 3 mL by Nebulization route every four (4) hours as needed for Wheezing. Class: Normal  
 Pharmacy: [facility]  Ph #: [phone] Route: Nebulization  
 albuterol (PROAIR HFA) 90 mcg/actuation inhaler 3 Sig: Take 2 Puffs by inhalation every four (4) hours as needed for Wheezing.   
 Class: Normal  
 Pharmacy: [facility] #: [phone] Route: Inhalation Follow-up Instructions Return if symptoms worsen or fail to improve. Patient Instructions Asthma Attack: Care Instructions Your Care Instructions During an asthma attack, the airways swell and narrow. This makes it hard to breathe. Severe asthma attacks can be life-threatening, but you can help prevent them by keeping your asthma under control and treating symptoms before they get bad. Symptoms include being short of breath, having chest tightness, coughing, and wheezing. Noting and treating these symptoms can also help you avoid future trips to the emergency room. The doctor has checked you carefully, but problems can develop later. If you notice any problems or new symptoms, get medical treatment right away. Follow-up care is a key part of your treatment and safety. Be sure to make and go to all appointments, and call your doctor if you are having problems. It's also a good idea to know your test results and keep a list of the medicines you take. How can you care for yourself at home? · Follow your asthma action plan to prevent and treat attacks. If you don't have an asthma action plan, work with your doctor to create one. · Take your asthma medicines exactly as prescribed. Talk to your doctor right away if you have any questions about how to take them. ¨ Use your quick-relief medicine when you have symptoms of an attack. Quick-relief medicine is usually an albuterol inhaler. Some people need to use quick-relief medicine before they exercise. ¨ Take your controller medicine every day, not just when you have symptoms. Controller medicine is usually an inhaled corticosteroid. The goal is to prevent problems before they occur. Don't use your controller medicine to treat an attack that has already started. It doesn't work fast enough to help. ¨ If your doctor prescribed corticosteroid pills to use during an attack, take them exactly as prescribed. It may take hours for the pills to work, but they may make the episode shorter and help you breathe better. ¨ Keep your quick-relief medicine with you at all times. · Talk to your doctor before using other medicines. Some medicines, such as aspirin, can cause asthma attacks in some people. · If you have a peak flow meter, use it to check how well you are breathing. This can help you predict when an asthma attack is going to occur. Then you can take medicine to prevent the asthma attack or make it less severe. · Do not smoke or allow others to smoke around you. Avoid smoky places. Smoking makes asthma worse. If you need help quitting, talk to your doctor about stop-smoking programs and medicines. These can increase your chances of quitting for good. · Learn what triggers an asthma attack for you, and avoid the triggers when you can. Common triggers include colds, smoke, air pollution, dust, pollen, mold, pets, cockroaches, stress, and cold air. · Avoid colds and the flu. Get a pneumococcal vaccine shot. If you have had one before, ask your doctor if you need a second dose. Get a flu vaccine every fall. If you must be around people with colds or the flu, wash your hands often. When should you call for help? Call 911 anytime you think you may need emergency care. For example, call if: 
· You have severe trouble breathing. Call your doctor now or seek immediate medical care if: 
· Your symptoms do not get better after you have followed your asthma action plan. · You have new or worse trouble breathing. · Your coughing and wheezing get worse. · You cough up dark brown or bloody mucus (sputum). · You have a new or higher fever.  
Watch closely for changes in your health, and be sure to contact your doctor if: 
· You need to use quick-relief medicine on more than 2 days a week (unless it is just for exercise). · You cough more deeply or more often, especially if you notice more mucus or a change in the color of your mucus. · You are not getting better as expected. Where can you learn more? Go to http://sanjay-davis.info/. Enter S216 in the search box to learn more about \"Asthma Attack: Care Instructions. \" Current as of: March 25, 2017 Content Version: 11.3 © 5611-1075 [employer]. Care instructions adapted under license by [employer] (which disclaims liability or warranty for this information). If you have questions about a medical condition or this instruction, always ask your healthcare professional. [place] [address] any warranty or liability for your use of this information. Introducing [facility]! Dear Corinne Alf: 
Thank you for requesting a "[employer] account. Our records indicate that you have previously registered for a "[employer] account but its currently inactive. Please call our "[employer] support line at [phone]. Additional Information If you have questions, please visit the Frequently Asked Questions section of the "[employer] website at [email or web address] Remember, "[employer] is NOT to be used for urgent needs. For medical emergencies, dial 911. Now available from your iPhone and Android! Please provide this summary of care documentation to your next provider. Your primary care clinician is listed as Srinivas Vega. If you have any questions after today's visit, please call [phone].

## 2018-03-28 ENCOUNTER — OFFICE VISIT (OUTPATIENT)
Dept: INTERNAL MEDICINE CLINIC | Age: 43
End: 2018-03-28

## 2018-03-28 VITALS
TEMPERATURE: 98.7 F | SYSTOLIC BLOOD PRESSURE: 124 MMHG | OXYGEN SATURATION: 98 % | BODY MASS INDEX: 36.5 KG/M2 | HEIGHT: 63 IN | HEART RATE: 78 BPM | DIASTOLIC BLOOD PRESSURE: 85 MMHG | WEIGHT: 206 LBS

## 2018-03-28 DIAGNOSIS — Z13.220 SCREENING FOR HYPERLIPIDEMIA: ICD-10-CM

## 2018-03-28 DIAGNOSIS — E55.9 VITAMIN D DEFICIENCY: ICD-10-CM

## 2018-03-28 DIAGNOSIS — Z00.00 PHYSICAL EXAM: Primary | ICD-10-CM

## 2018-03-28 DIAGNOSIS — R63.5 WEIGHT GAIN: ICD-10-CM

## 2018-03-28 DIAGNOSIS — F32.A DEPRESSION, UNSPECIFIED DEPRESSION TYPE: ICD-10-CM

## 2018-03-28 DIAGNOSIS — Z13.1 SCREENING FOR DIABETES MELLITUS: ICD-10-CM

## 2018-03-28 DIAGNOSIS — M25.512 LEFT SHOULDER PAIN, UNSPECIFIED CHRONICITY: ICD-10-CM

## 2018-03-28 DIAGNOSIS — E53.8 VITAMIN B 12 DEFICIENCY: ICD-10-CM

## 2018-03-28 PROBLEM — E66.01 SEVERE OBESITY (BMI 35.0-39.9) WITH COMORBIDITY (HCC): Status: ACTIVE | Noted: 2018-03-28

## 2018-03-28 LAB
ALBUMIN SERPL-MCNC: 4.3 G/DL (ref 3.9–5.4)
ALKALINE PHOS POC: 78 U/L (ref 38–126)
ALT SERPL-CCNC: 68 U/L (ref 9–52)
AST SERPL-CCNC: 42 U/L (ref 14–36)
BACTERIA UA POCT, BACTPOCT: NORMAL
BILIRUB UR QL STRIP: NEGATIVE
BUN BLD-MCNC: 9 MG/DL (ref 7–17)
CALCIUM BLD-MCNC: 9.7 MG/DL (ref 8.4–10.2)
CASTS UA POCT: 0
CHLORIDE BLD-SCNC: 104 MMOL/L (ref 98–107)
CHOLEST SERPL-MCNC: 153 MG/DL (ref 0–200)
CLUE CELLS, CLUEPOCT: NEGATIVE
CO2 POC: 29 MMOL/L (ref 22–32)
CREAT BLD-MCNC: 0.6 MG/DL (ref 0.7–1.2)
CRYSTALS UA POCT, CRYSPOCT: NEGATIVE
EGFR (POC): 112.4
EPITHELIAL CELLS POCT: NORMAL
GLUCOSE POC: 88 MG/DL (ref 65–105)
GLUCOSE UR-MCNC: NEGATIVE MG/DL
GRAN# POC: 3.1 K/UL (ref 2–7.8)
GRAN% POC: 64.1 % (ref 37–92)
HBA1C MFR BLD HPLC: 5.3 % (ref 4.5–5.7)
HCT VFR BLD CALC: 43.9 % (ref 37–51)
HDLC SERPL-MCNC: 52 MG/DL (ref 35–130)
HGB BLD-MCNC: 14.4 G/DL (ref 12–18)
KETONES P FAST UR STRIP-MCNC: NEGATIVE MG/DL
LDL CHOLESTEROL POC: 48.4 MG/DL (ref 0–130)
LY# POC: 1.4 K/UL (ref 0.6–4.1)
LY% POC: 31.6 % (ref 10–58.5)
MCH RBC QN: 28.3 PG (ref 26–32)
MCHC RBC-ENTMCNC: 32.8 G/DL (ref 30–36)
MCV RBC: 86 FL (ref 80–97)
MID #, POC: 0.2 K/UL (ref 0–1.8)
MID% POC: 4.3 % (ref 0.1–24)
MUCUS UA POCT, MUCPOCT: NORMAL
NON-HDL GOAL (POC): 52.6
PH UR STRIP: 5 [PH] (ref 5–7)
PLATELET # BLD: 233 K/UL (ref 140–440)
POTASSIUM SERPL-SCNC: 4.5 MMOL/L (ref 3.6–5)
PROT SERPL-MCNC: 7.3 G/DL (ref 6.3–8.2)
PROT UR QL STRIP: NEGATIVE
RBC # BLD: 5.09 M/UL (ref 4.2–6.3)
RBC UA POCT, RBCPOCT: 0
SODIUM SERPL-SCNC: 141 MMOL/L (ref 137–145)
SP GR UR STRIP: 1.02 (ref 1.01–1.02)
T4 FREE SERPL-MCNC: 0.97 NG/DL (ref 0.71–1.85)
TCHOL/HDL RATIO (POC): 2.9 (ref 0–4)
TOTAL BILIRUBIN POC: 0.8 MG/DL (ref 0.2–1.3)
TRICH UA POCT, TRICHPOC: NEGATIVE
TRIGL SERPL-MCNC: 242 MG/DL (ref 0–200)
TSH BLD-ACNC: 1.96 UIU/ML (ref 0.4–4.2)
UA UROBILINOGEN AMB POC: NORMAL (ref 0.2–1)
URINALYSIS CLARITY POC: NORMAL
URINALYSIS COLOR POC: NORMAL
URINE BLOOD POC: NEGATIVE
URINE CULT COMMENT, POCT: NORMAL
URINE LEUKOCYTES POC: NEGATIVE
URINE NITRITES POC: NEGATIVE
VITAMIN D POC: 18 NG/ML (ref 30–96)
WBC # BLD: 4.7 K/UL (ref 4.1–10.9)
WBC UA POCT, WBCPOCT: 0
YEAST UA POCT, YEASTPOC: NEGATIVE

## 2018-03-28 RX ORDER — ALBUTEROL SULFATE 90 UG/1
2 AEROSOL, METERED RESPIRATORY (INHALATION)
Qty: 1 INHALER | Refills: 3 | Status: SHIPPED | OUTPATIENT
Start: 2018-03-28

## 2018-03-28 RX ORDER — ALBUTEROL SULFATE 0.83 MG/ML
2.5 SOLUTION RESPIRATORY (INHALATION)
Qty: 1 PACKAGE | Refills: 3 | Status: SHIPPED | OUTPATIENT
Start: 2018-03-28

## 2018-03-28 RX ORDER — BUDESONIDE AND FORMOTEROL FUMARATE DIHYDRATE 160; 4.5 UG/1; UG/1
2 AEROSOL RESPIRATORY (INHALATION) 2 TIMES DAILY
Qty: 1 INHALER | Refills: 6 | Status: SHIPPED | OUTPATIENT
Start: 2018-03-28 | End: 2018-11-05 | Stop reason: SDUPTHER

## 2018-03-28 RX ORDER — ESCITALOPRAM OXALATE 5 MG/1
5 TABLET ORAL DAILY
Qty: 30 TAB | Refills: 1 | Status: SHIPPED | OUTPATIENT
Start: 2018-03-28 | End: 2018-04-16 | Stop reason: SDUPTHER

## 2018-03-28 NOTE — PATIENT INSTRUCTIONS
Asthma Attack: Care Instructions  Your Care Instructions    During an asthma attack, the airways swell and narrow. This makes it hard to breathe. Severe asthma attacks can be life-threatening, but you can help prevent them by keeping your asthma under control and treating symptoms before they get bad. Symptoms include being short of breath, having chest tightness, coughing, and wheezing. Noting and treating these symptoms can also help you avoid future trips to the emergency room. The doctor has checked you carefully, but problems can develop later. If you notice any problems or new symptoms, get medical treatment right away. Follow-up care is a key part of your treatment and safety. Be sure to make and go to all appointments, and call your doctor if you are having problems. It's also a good idea to know your test results and keep a list of the medicines you take. How can you care for yourself at home? · Follow your asthma action plan to prevent and treat attacks. If you don't have an asthma action plan, work with your doctor to create one. · Take your asthma medicines exactly as prescribed. Talk to your doctor right away if you have any questions about how to take them. ¨ Use your quick-relief medicine when you have symptoms of an attack. Quick-relief medicine is usually an albuterol inhaler. Some people need to use quick-relief medicine before they exercise. ¨ Take your controller medicine every day, not just when you have symptoms. Controller medicine is usually an inhaled corticosteroid. The goal is to prevent problems before they occur. Don't use your controller medicine to treat an attack that has already started. It doesn't work fast enough to help. ¨ If your doctor prescribed corticosteroid pills to use during an attack, take them exactly as prescribed. It may take hours for the pills to work, but they may make the episode shorter and help you breathe better.   ¨ Keep your quick-relief medicine with you at all times. · Talk to your doctor before using other medicines. Some medicines, such as aspirin, can cause asthma attacks in some people. · If you have a peak flow meter, use it to check how well you are breathing. This can help you predict when an asthma attack is going to occur. Then you can take medicine to prevent the asthma attack or make it less severe. · Do not smoke or allow others to smoke around you. Avoid smoky places. Smoking makes asthma worse. If you need help quitting, talk to your doctor about stop-smoking programs and medicines. These can increase your chances of quitting for good. · Learn what triggers an asthma attack for you, and avoid the triggers when you can. Common triggers include colds, smoke, air pollution, dust, pollen, mold, pets, cockroaches, stress, and cold air. · Avoid colds and the flu. Get a pneumococcal vaccine shot. If you have had one before, ask your doctor if you need a second dose. Get a flu vaccine every fall. If you must be around people with colds or the flu, wash your hands often. When should you call for help? Call 911 anytime you think you may need emergency care. For example, call if:  ? · You have severe trouble breathing. ?Call your doctor now or seek immediate medical care if:  ? · Your symptoms do not get better after you have followed your asthma action plan. ? · You have new or worse trouble breathing. ? · Your coughing and wheezing get worse. ? · You cough up dark brown or bloody mucus (sputum). ? · You have a new or higher fever. ? Watch closely for changes in your health, and be sure to contact your doctor if:  ? · You need to use quick-relief medicine on more than 2 days a week (unless it is just for exercise). ? · You cough more deeply or more often, especially if you notice more mucus or a change in the color of your mucus. ? · You are not getting better as expected. Where can you learn more?   Go to http://sanjay-davis.info/. Enter Z524 in the search box to learn more about \"Asthma Attack: Care Instructions. \"  Current as of: May 12, 2017  Content Version: 11.4  © 3616-3548 Healthwise, [employer]. Care instructions adapted under license by [employer] (which disclaims liability or warranty for this information). If you have questions about a medical condition or this instruction, always ask your healthcare professional. [place] [address] any warranty or liability for your use of this information.

## 2018-03-28 NOTE — PROGRESS NOTES
Subjective:  Ms. Duyen Mercado is a pleasant 43year old lady who comes in for a history and physical.    History of Present Illness:  Ms. Duyen Mercado tells me that for the past eight months she has felt depressed and anxious. She is finding herself crying very often. She tells me that her partner of many years has started a new job, the 3-11 shift, and she finds herself at home with the kids 24 hours a day. Because she is feeling depressed she is eating more and has put some weight on, which she is unhappy about. She is becoming less and less active. She denies any previous history of depression. She denies any suicidal thoughts. Past Medical History:   Diagnosis Date    Annual physical exam 10/10/2017    Asthma     Crohn disease ([facility] 10/10/2017    [facility]'s disease ([facility]     Depression     Elevated LFTs 10/10/2017    Environmental allergies     Fatty liver     Foot pain 10/10/2017    Gastrointestinal disorder     Crohns    Migraine     Osteoporosis screening 10/10/2017    Panic attacks     Screening for diabetes mellitus 10/10/2017    Screening for hyperlipidemia 10/10/2017    Screening for hypothyroidism 10/10/2017    Unspecified vitamin D deficiency     Vitamin B 12 deficiency 10/10/2017    Vitamin B12 deficiency     Vitamin D deficiency 10/10/2017     Past Surgical History:   Procedure Laterality Date    ABDOMEN SURGERY PROC UNLISTED      colon resection    HX APPENDECTOMY      HX OTHER SURGICAL  1999    terminal ileum resected    HX OTHER SURGICAL  1999    bladder reconstruction due to fistula    HX UROLOGICAL      bladder       Current Outpatient Prescriptions on File Prior to Visit   Medication Sig Dispense Refill    fluticasone (FLONASE) 50 mcg/actuation nasal spray 2 Sprays by Both Nostrils route daily.  Nebulizer & Compressor machine Patient already have instructions how to use. 1 Each 0    ERGOCALCIFEROL, VITAMIN D2, (VITAMIN D2 PO) Take  by mouth.       cefUROXime (CEFTIN) 500 mg tablet Take 1 Tab by mouth two (2) times a day. 20 Tab 0     No current facility-administered medications on file prior to visit. Allergies   Allergen Reactions    Latex Shortness of Breath and Rash    Latex Rash    Other Medication Rash     abdhesive tape, told not to take antibiotics   Past Medical History/Surgeries:    1. Multiple D&Cs. 2. Right oophorectomy secondary to ectopic pregnancy. 3. Partial colectomy and resection of small intestine secondary to Crohn's disease. This was done by Dr. Rodger Kapadia back in 2002. She has done well from that standpoint. Her last colonoscopy was done by Dr. Nimisha Colmenares at [facility] in 2016. She is not currently seeing a gastroenterologist.  4.  with subsequent left tubal ligation. Illnesses:  1. Crohn's disease as noted previously, which she is currently managing with diet and prn Imodium. 2. Asthma. 3. Allergic rhinitis. 4. History of elevated blood pressure during pregnancy, but this has not been a recurrent problem. Family History:  Father is 61years of age, alive with cirrhosis of liver secondary to alcoholism. Mother is 72years of age, alive with history of cervical cancer. Two sisters living and well. One sister has had mini strokes. Social History:  She lives with her partner of many years. 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 She is contemplating looking for a part time job. Allergies:  Adhesive and latex, which cause hives. Allergies to onions. Medications:  1. Imodium prn.  2. Flonase, two sprays both nostrils daily. 3. Symbicort 160/4.5, two puffs twice daily. 4. Albuterol inhaler, two puffs every four hours as needed for wheezing. 5. Albuterol 2.5 mg/3 mL every four hours as needed per nebulizer. Habits:  Nonsmoker. Has an occasional glass of wine. Review of Systems:  HEENT:  Does note occasional sinus and tension headaches. Denies any dizziness. She does have some blurred vision. She is already scheduled for an eye exam coming up this week, as well as dental exam.  She does get occasional tinnitus. She does wear glasses. CVR:  Denies any chest pain or palpitations. Denies any syncopal episode . Denies any shortness of breath other than if her asthma is bothering her, although she has done well from that standpoint. Denies hemoptysis, PND or orthopnea. Denies any ankle edema. GI:  Appetite is good, weight has gone up in the last year. Does have a normal bowel movement without presence of blood in stools or melena. She has a tendency to have more diarrhea than constipation. :  Denies any urinary symptoms. GYN:  She has not had a pelvic and pap in two years. She did have mammogram last fall. Physical Examination:  GENERAL:  Pleasant, obese, young lady in no acute distress. She is alert and oriented. VITALS:  BP: 124/85. P: 78.  T: 98.7. O2 sat: 98%. WT: 206 lbs. HT: 5'3\". HEENT:  Normocephalic, atraumatic. PERRLA, EOMI. TMs normal.  Mouth mucosa pink. Tongue midline. Pharynx normal.  NECK:  Supple without adenopathy, thyromegaly or carotid bruits. CHEST:  Lungs clear to ausculation, no rales or wheezes. CV:  Heart regular rhythm without murmur or gallop. BREASTS:  Without masses or nipple discharge. No axillary, infra or supraclavicular adenopathy noted. ABDOMEN:  Bowel sounds present. Soft, non tender, no organomegaly or masses. Well healed surgical incisions noted. EXTREMITIES:  No edema or calf tenderness. Distal pulses were present, strong and symmetrical.  NEUROLOGIC:  Cranial nerves II-XII intact. Excellent strength in the upper and lower extremities against resistance. Sensation is preserved. Romberg is negative. Reflexes 2+ and symmetrical.  She had excellent coordination. MSK:  Left shoulder - she does have pain on posterior aspect of shoulder on motion.   No increase in warmth or redness noted. No deformity. Studies:  Two views of left shoulder were unremarkable. Impression:  1. Crohn's disease, status post partial colon and small intestine resection. 2. Asthma. 3. Allergic rhinitis. 4. Obesity. 5. Left shoulder pain. 6. Anxiety/depression. Plan:  1. She was fasting this morning so therefore it was opted to get all of her baseline lab studies. I will call her as soon as I have the results. 2. In terms of her depression it was opted to start Lexapro 5 mg daily. I do want to see her back in two weeks. I am also referring her for counseling. 3. In terms of her left shoulder, she did see an orthopedist in the past, but couldn't remember the name and will call me if she makes a decision to get an evaluation.

## 2018-03-28 NOTE — PROGRESS NOTES
Aidee Wilson presents with   Chief Complaint   Patient presents with    Physical     Patient here for a complete physical.  She is fasting. Needs refills on her Albuterol inhaler & nebulizer solution. 1. Have you been to the ER, urgent care clinic since your last visit? Hospitalized since your last visit? No    2. Have you seen or consulted any other health care providers outside of the [address] [place] [address] since your last visit? Include any pap smears or colon screening.  No

## 2018-03-28 NOTE — MR AVS SNAPSHOT
[facility] 
 
 
 [address] [address] [phone] Patient: Melisa Coates MRN: [id number] MNT:[id number] Visit Information Date & Time Provider Department Dept. Phone Encounter #  
 3/28/2018  8:30 AM Shalonda Hernandez NP [facility] [phone] [id number] Follow-up Instructions Return in about 2 weeks (around 4/11/2018). Your Appointments 4/10/2018 10:00 AM  
Follow Up with ISREAL Banks [facility] ([address]) Appt Note: 2 week follow up [address] [address] [address] [address] Upcoming Health Maintenance Date Due Pneumococcal 19-64 Medium Risk (1 of 1 - PPSV23) 9/11/1994 DTaP/Tdap/Td series (1 - Tdap) 9/11/1996 PAP AKA CERVICAL CYTOLOGY 9/11/1996 Influenza Age 5 to Adult 8/1/2017 Allergies as of 3/28/2018  Review Complete On: 3/28/2018 By: Shalonda Hernandez NP Severity Noted Reaction Type Reactions Latex Medium 07/02/2010   Systemic Shortness of Breath, Rash Latex  06/14/2010    Rash Other Medication Medium 07/02/2010   Side Effect Rash  
 abdhesive tape, told not to take antibiotics Current Immunizations  Never Reviewed No immunizations on file. Not reviewed this visit You Were Diagnosed With   
  
 Codes Comments Physical exam    -  Primary ICD-10-CM: Z00.00 ICD-9-CM: V70.9 Weight gain     ICD-10-CM: R63.5 ICD-9-CM: 783.1 Depression, unspecified depression type     ICD-10-CM: F32.9 ICD-9-CM: 330 Vitamin D deficiency     ICD-10-CM: E55.9 ICD-9-CM: 268.9 Vitamin B 12 deficiency     ICD-10-CM: E53.8 ICD-9-CM: 266.2 Left shoulder pain, unspecified chronicity     ICD-10-CM: M25.512 ICD-9-CM: 719.41   
 Screening for diabetes mellitus     ICD-10-CM: Z13.1 ICD-9-CM: V77.1 Screening for hyperlipidemia     ICD-10-CM: Z13.220 ICD-9-CM: V77.91 Vitals BP Pulse Temp Height(growth percentile) Weight(growth percentile) SpO2  
 124/85 (BP 1 Location: Left arm, BP Patient Position: Sitting) 78 98.7 °F (37.1 °C) (Oral) 5' 3\" (1.6 m) 206 lb (93.4 kg) 98% BMI OB Status Smoking Status 36.49 kg/m2 Having regular periods Former Smoker BMI and BSA Data Body Mass Index Body Surface Area  
 36.49 kg/m 2 2.04 m 2 Preferred Pharmacy Pharmacy Name Phone [id number] [id number], [address] [phone] Your Updated Medication List  
  
   
This list is accurate as of 3/28/18 10:03 AM.  Always use your most recent med list.  
  
  
  
  
 * albuterol 2.5 mg /3 mL (0.083 %) nebulizer solution Commonly known as:  PROVENTIL VENTOLIN  
3 mL by Nebulization route every four (4) hours as needed for Wheezing. * albuterol 90 mcg/actuation inhaler Commonly known as:  PROAIR HFA Take 2 Puffs by inhalation every four (4) hours as needed for Wheezing. budesonide-formoterol 160-4.5 mcg/actuation Hfaa Commonly known as:  SYMBICORT Take 2 Puffs by inhalation two (2) times a day. cefUROXime 500 mg tablet Commonly known as:  CEFTIN Take 1 Tab by mouth two (2) times a day. escitalopram oxalate 5 mg tablet Commonly known as:  Negro Swapna Take 1 Tab by mouth daily. FLONASE 50 mcg/actuation nasal spray Generic drug:  fluticasone 2 Sprays by Both Nostrils route daily. Nebulizer & Compressor machine Patient already have instructions how to use. VITAMIN D2 PO Take  by mouth. * Notice: This list has 2 medication(s) that are the same as other medications prescribed for you. Read the directions carefully, and ask your doctor or other care provider to review them with you. Prescriptions Sent to Pharmacy Refills  
 escitalopram oxalate (LEXAPRO) 5 mg tablet 1 Sig: Take 1 Tab by mouth daily. Class: Normal  
 [id number] [address] Ph #: [phone] Route: Oral  
 albuterol (PROVENTIL VENTOLIN) 2.5 mg /3 mL (0.083 %) nebulizer solution 3 Sig: 3 mL by Nebulization route every four (4) hours as needed for Wheezing. Class: Normal  
 Pharmacy: [facility]acy #[id number]- [place] [address] Ph #: [phone] Route: Nebulization  
 albuterol (PROAIR HFA) 90 mcg/actuation inhaler 3 Sig: Take 2 Puffs by inhalation every four (4) hours as needed for Wheezing. Class: Normal  
 [id number] [place] [address] Ph #: [phone] Route: Inhalation  
 budesonide-formoterol (SYMBICORT) 160-4.5 mcg/actuation HFAA 6 Sig: Take 2 Puffs by inhalation two (2) times a day. Class: Normal  
 [id number] [place] [address] Ph #: [phone] Route: Inhalation We Performed the Following AMB POC COMPLETE CBC,AUTOMATED ENTER [id number] CPT(R)] AMB POC COMPREHENSIVE METABOLIC PANEL [49271 CPT(R)] AMB POC HEMOGLOBIN A1C [74149 CPT(R)] AMB POC LIPID PROFILE [85886 CPT(R)] AMB POC T4, FREE [id number] CPT(R)] AMB POC TSH [83634 CPT(R)] AMB POC URINALYSIS DIP STICK AUTO W/ MICRO  [56294 CPT(R)] AMB POC VITAMIN D [35157 CPT(R)] VITAMIN B12 [id number] CPT(R)] Follow-up Instructions Return in about 2 weeks (around 4/11/2018). To-Do List   
 03/28/2018 Imaging:  XR SHOULDER LT AP/LAT MIN 2 V Patient Instructions Asthma Attack: Care Instructions Your Care Instructions During an asthma attack, the airways swell and narrow.  This makes it hard to breathe. Severe asthma attacks can be life-threatening, but you can help prevent them by keeping your asthma under control and treating symptoms before they get bad. Symptoms include being short of breath, having chest tightness, coughing, and wheezing. Noting and treating these symptoms can also help you avoid future trips to the emergency room. The doctor has checked you carefully, but problems can develop later. If you notice any problems or new symptoms, get medical treatment right away. Follow-up care is a key part of your treatment and safety. Be sure to make and go to all appointments, and call your doctor if you are having problems. It's also a good idea to know your test results and keep a list of the medicines you take. How can you care for yourself at home? · Follow your asthma action plan to prevent and treat attacks. If you don't have an asthma action plan, work with your doctor to create one. · Take your asthma medicines exactly as prescribed. Talk to your doctor right away if you have any questions about how to take them. ¨ Use your quick-relief medicine when you have symptoms of an attack. Quick-relief medicine is usually an albuterol inhaler. Some people need to use quick-relief medicine before they exercise. ¨ Take your controller medicine every day, not just when you have symptoms. Controller medicine is usually an inhaled corticosteroid. The goal is to prevent problems before they occur. Don't use your controller medicine to treat an attack that has already started. It doesn't work fast enough to help. ¨ If your doctor prescribed corticosteroid pills to use during an attack, take them exactly as prescribed. It may take hours for the pills to work, but they may make the episode shorter and help you breathe better. ¨ Keep your quick-relief medicine with you at all times. · Talk to your doctor before using other medicines.  Some medicines, such as aspirin, can cause asthma attacks in some people. · If you have a peak flow meter, use it to check how well you are breathing. This can help you predict when an asthma attack is going to occur. Then you can take medicine to prevent the asthma attack or make it less severe. · Do not smoke or allow others to smoke around you. Avoid smoky places. Smoking makes asthma worse. If you need help quitting, talk to your doctor about stop-smoking programs and medicines. These can increase your chances of quitting for good. · Learn what triggers an asthma attack for you, and avoid the triggers when you can. Common triggers include colds, smoke, air pollution, dust, pollen, mold, pets, cockroaches, stress, and cold air. · Avoid colds and the flu. Get a pneumococcal vaccine shot. If you have had one before, ask your doctor if you need a second dose. Get a flu vaccine every fall. If you must be around people with colds or the flu, wash your hands often. When should you call for help? Call 911 anytime you think you may need emergency care. For example, call if: 
? · You have severe trouble breathing. ?Call your doctor now or seek immediate medical care if: 
? · Your symptoms do not get better after you have followed your asthma action plan. ? · You have new or worse trouble breathing. ? · Your coughing and wheezing get worse. ? · You cough up dark brown or bloody mucus (sputum). ? · You have a new or higher fever. ? Watch closely for changes in your health, and be sure to contact your doctor if: 
? · You need to use quick-relief medicine on more than 2 days a week (unless it is just for exercise). ? · You cough more deeply or more often, especially if you notice more mucus or a change in the color of your mucus. ? · You are not getting better as expected. Where can you learn more? Go to http://sanjay-davis.info/.  
Enter H609 in the search box to learn more about \"Asthma Attack: Care Instructions. \" Current as of: May 12, 2017 Content Version: 11.4 © 9363-3214 [employer]. Care instructions adapted under license by [employer] (which disclaims liability or warranty for this information). If you have questions about a medical condition or this instruction, always ask your healthcare professional. [address] any warranty or liability for your use of this information. Introducing [facility]! Noe Hernandez introduces [employer] patient portal. Now you can access parts of your medical record, email your doctor's office, and request medication refills online. 1. In your internet browser, go to [email or web address] 2. Click on the First Time User? Click Here link in the Sign In box. You will see the New Member Sign Up page. 3. Enter your [employer] Access Code exactly as it appears below. You will not need to use this code after youve completed the sign-up process. If you do not sign up before the expiration date, you must request a new code. · [employer] Access Code: [email or web address] Expires: 6/26/2018  8:00 AM 
 
4. Enter the last four digits of your Social Security Number (xxxx) and Date of Birth (mm/dd/yyyy) as indicated and click Submit. You will be taken to the next sign-up page. 5. Create a [employer] ID. This will be your [employer] login ID and cannot be changed, so think of one that is secure and easy to remember. 6. Create a [employer] password. You can change your password at any time. 7. Enter your Password Reset Question and Answer. This can be used at a later time if you forget your password. 8. Enter your e-mail address. You will receive e-mail notification when new information is available in [address] 9. Click Sign Up. You can now view and download portions of your medical record. 10. Click the Download Summary menu link to download a portable copy of your medical information. If you have questions, please visit the Frequently Asked Questions section of the [employer] website. Remember, [employer] is NOT to be used for urgent needs. For medical emergencies, dial 911. Now available from your iPhone and Android! Please provide this summary of care documentation to your next provider. Your primary care clinician is listed as Vaughn Pagan. If you have any questions after today's visit, please call [phone].

## 2018-03-29 LAB — VIT B12 SERPL-MCNC: 291 PG/ML (ref 232–1245)

## 2018-04-02 RX ORDER — ERGOCALCIFEROL 1.25 MG/1
50000 CAPSULE ORAL
Qty: 12 CAP | Refills: 0 | Status: SHIPPED | OUTPATIENT
Start: 2018-04-02 | End: 2018-04-10 | Stop reason: SDUPTHER

## 2018-04-02 RX ORDER — ERGOCALCIFEROL 1.25 MG/1
50000 CAPSULE ORAL
Qty: 12 CAP | Refills: 0 | Status: SHIPPED | OUTPATIENT
Start: 2018-04-02 | End: 2019-02-26 | Stop reason: ALTCHOICE

## 2018-04-02 NOTE — PROGRESS NOTES
Lab results discussed with patient. Start Vitamin d 24332 units weekly x 3 months. Follow up appointment 3 months.

## 2018-04-10 ENCOUNTER — OFFICE VISIT (OUTPATIENT)
Dept: INTERNAL MEDICINE CLINIC | Age: 43
End: 2018-04-10

## 2018-04-10 VITALS
OXYGEN SATURATION: 98 % | DIASTOLIC BLOOD PRESSURE: 80 MMHG | WEIGHT: 205 LBS | BODY MASS INDEX: 36.32 KG/M2 | HEIGHT: 63 IN | HEART RATE: 72 BPM | SYSTOLIC BLOOD PRESSURE: 128 MMHG

## 2018-04-10 DIAGNOSIS — F41.9 ANXIETY: Primary | ICD-10-CM

## 2018-04-10 RX ORDER — ALPRAZOLAM 0.25 MG/1
0.25 TABLET ORAL
Qty: 60 TAB | Refills: 0 | Status: SHIPPED | OUTPATIENT
Start: 2018-04-10 | End: 2018-06-12 | Stop reason: SDUPTHER

## 2018-04-10 NOTE — PROGRESS NOTES
Subjective:  Ms. Felix Cagle is a pleasant 43year old lady who comes in today for follow up of depression. I recently started her on Lexapro 5 mg daily. She denies any side effects from the medication. Yesterday she had increased her dosage to 10 mg daily. She is starting to feel some better. She however continues to have some panic attacks. She has never tried Xanax in the past.    Past Medical History:   Diagnosis Date    Annual physical exam 10/10/2017    Asthma     Crohn disease ([facility] [address] 10/10/2017    Crohn's disease ([facility] [address]     Depression     Elevated LFTs 10/10/2017    Environmental allergies     Fatty liver     Foot pain 10/10/2017    Gastrointestinal disorder     Crohns    Migraine     Osteoporosis screening 10/10/2017    Panic attacks     Screening for diabetes mellitus 10/10/2017    Screening for hyperlipidemia 10/10/2017    Screening for hypothyroidism 10/10/2017    Unspecified vitamin D deficiency     Vitamin B 12 deficiency 10/10/2017    Vitamin B12 deficiency     Vitamin D deficiency 10/10/2017     Past Surgical History:   Procedure Laterality Date    ABDOMEN SURGERY PROC UNLISTED      colon resection    HX APPENDECTOMY      HX OTHER SURGICAL  1999    terminal ileum resected    HX OTHER SURGICAL  1999    bladder reconstruction due to fistula    HX UROLOGICAL      bladder       Current Outpatient Prescriptions on File Prior to Visit   Medication Sig Dispense Refill    ergocalciferol (ERGOCALCIFEROL) 50,000 unit capsule Take 1 Cap by mouth every seven (7) days. 12 Cap 0    escitalopram oxalate (LEXAPRO) 5 mg tablet Take 1 Tab by mouth daily. 30 Tab 1    albuterol (PROVENTIL VENTOLIN) 2.5 mg /3 mL (0.083 %) nebulizer solution 3 mL by Nebulization route every four (4) hours as needed for Wheezing. 1 Package 3    albuterol (PROAIR HFA) 90 mcg/actuation inhaler Take 2 Puffs by inhalation every four (4) hours as needed for Wheezing.  1 Inhaler 3    budesonide-formoterol (SYMBICORT) 160-4.5 mcg/actuation HFAA Take 2 Puffs by inhalation two (2) times a day. 1 Inhaler 6    fluticasone (FLONASE) 50 mcg/actuation nasal spray 2 Sprays by Both Nostrils route daily.  Nebulizer & Compressor machine Patient already have instructions how to use. 1 Each 0    cefUROXime (CEFTIN) 500 mg tablet Take 1 Tab by mouth two (2) times a day. 20 Tab 0     No current facility-administered medications on file prior to visit. Allergies   Allergen Reactions    Latex Shortness of Breath and Rash    Latex Rash    Tape [Adhesive] Rash   Physical Examination:  GENERAL:  Pleasant young lady in no acute distress. She is alert and oriented. NECK:  Supple without adenopathy. CHEST:  Lungs clear to auscultation. CV:  Heart regular rhythm without murmur. EXTREMITIES:  No edema or calf tenderness. Distal pulses were present. Impression:  1. Anxiety/depression. Plan:  1. She will continue with Lexapro 10 mg daily and I am adding Xanax 0.25 mg twice daily as needed for panic attack. 2. I do want to see her back in six weeks. 3. She will call me in two weeks and if she is tolerating Lexapro then we can certainly consider increasing it further. She is in agreement  4. While here today we also talked about the importance of diet, exercise and weight loss. She is in agreement and looking forward to starting at the gym in the next week or so.

## 2018-04-10 NOTE — PROGRESS NOTES
Melissa Orta presents with   Chief Complaint   Patient presents with    Follow-up     bp check & new medication   Patient here for a 2 week followup of a new medication & BP check. States she is feeling somewhat better. 1. Have you been to the ER, urgent care clinic since your last visit? Hospitalized since your last visit? No    2. Have you seen or consulted any other health care providers outside of the [address] [place] [address] since your last visit? Include any pap smears or colon screening.  No

## 2018-04-10 NOTE — PATIENT INSTRUCTIONS
Anxiety Disorder: Care Instructions  Your Care Instructions    Anxiety is a normal reaction to stress. Difficult situations can cause you to have symptoms such as sweaty palms and a nervous feeling. In an anxiety disorder, the symptoms are far more severe. Constant worry, muscle tension, trouble sleeping, nausea and diarrhea, and other symptoms can make normal daily activities difficult or impossible. These symptoms may occur for no reason, and they can affect your work, school, or social life. Medicines, counseling, and self-care can all help. Follow-up care is a key part of your treatment and safety. Be sure to make and go to all appointments, and call your doctor if you are having problems. It's also a good idea to know your test results and keep a list of the medicines you take. How can you care for yourself at home? · Take medicines exactly as directed. Call your doctor if you think you are having a problem with your medicine. · Go to your counseling sessions and follow-up appointments. · Recognize and accept your anxiety. Then, when you are in a situation that makes you anxious, say to yourself, \"This is not an emergency. I feel uncomfortable, but I am not in danger. I can keep going even if I feel anxious. \"  · Be kind to your body:  ¨ Relieve tension with exercise or a massage. ¨ Get enough rest.  ¨ Avoid alcohol, caffeine, nicotine, and illegal drugs. They can increase your anxiety level and cause sleep problems. ¨ Learn and do relaxation techniques. See below for more about these techniques. · Engage your mind. Get out and do something you enjoy. Go to a funny movie, or take a walk or hike. Plan your day. Having too much or too little to do can make you anxious. · Keep a record of your symptoms. Discuss your fears with a good friend or family member, or join a support group for people with similar problems. Talking to others sometimes relieves stress.   · Get involved in social groups, or volunteer to help others. Being alone sometimes makes things seem worse than they are. · Get at least 30 minutes of exercise on most days of the week to relieve stress. Walking is a good choice. You also may want to do other activities, such as running, swimming, cycling, or playing tennis or team sports. Relaxation techniques  Do relaxation exercises 10 to 20 minutes a day. You can play soothing, relaxing music while you do them, if you wish. · Tell others in your house that you are going to do your relaxation exercises. Ask them not to disturb you. · Find a comfortable place, away from all distractions and noise. · Lie down on your back, or sit with your back straight. · Focus on your breathing. Make it slow and steady. · Breathe in through your nose. Breathe out through either your nose or mouth. · Breathe deeply, filling up the area between your navel and your rib cage. Breathe so that your belly goes up and down. · Do not hold your breath. · Breathe like this for 5 to 10 minutes. Notice the feeling of calmness throughout your whole body. As you continue to breathe slowly and deeply, relax by doing the following for another 5 to 10 minutes:  · Tighten and relax each muscle group in your body. You can begin at your toes and work your way up to your head. · Imagine your muscle groups relaxing and becoming heavy. · Empty your mind of all thoughts. · Let yourself relax more and more deeply. · Become aware of the state of calmness that surrounds you. · When your relaxation time is over, you can bring yourself back to alertness by moving your fingers and toes and then your hands and feet and then stretching and moving your entire body. Sometimes people fall asleep during relaxation, but they usually wake up shortly afterward. · Always give yourself time to return to full alertness before you drive a car or do anything that might cause an accident if you are not fully alert.  Never play a relaxation tape while you drive a car. When should you call for help? Call 911 anytime you think you may need emergency care. For example, call if:  ? · You feel you cannot stop from hurting yourself or someone else. ? Keep the numbers for these national suicide hotlines: 3-842-090-TALK (6-710.487.9513) and 9-845-XICLUXA (3-660.666.8665). If you or someone you know talks about suicide or feeling hopeless, get help right away. ? Watch closely for changes in your health, and be sure to contact your doctor if:  ? · You have anxiety or fear that affects your life. ? · You have symptoms of anxiety that are new or different from those you had before. Where can you learn more? Go to http://sanjay-davis.info/. Enter P754 in the search box to learn more about \"Anxiety Disorder: Care Instructions. \"  Current as of: May 12, 2017  Content Version: 11.4  © 9245-0080 Healthwise, Incorporated. Care instructions adapted under license by [employer] (which disclaims liability or warranty for this information). If you have questions about a medical condition or this instruction, always ask your healthcare professional. [address] any warranty or liability for your use of this information.

## 2018-04-10 NOTE — MR AVS SNAPSHOT
[address] [id number] [phone] Patient: Savage Escobar MRN: [id number] Visit Information Date & Time Provider Department Dept. Phone Encounter #  
 4/10/2018 10:00 AM Alex Jaramillo NP [facility] [phone] [id number] Follow-up Instructions Return in about 6 weeks (around 5/22/2018). Upcoming Health Maintenance Date Due Pneumococcal 19-64 Medium Risk (1 of 1 - PPSV23) 9/11/1994 DTaP/Tdap/Td series (1 - Tdap) 9/11/1996 PAP AKA CERVICAL CYTOLOGY 9/11/1996 Influenza Age 5 to Adult 8/1/2017 Allergies as of 4/10/2018  Review Complete On: 4/10/2018 By: Alex Jaramillo NP Severity Noted Reaction Type Reactions Latex Medium 07/02/2010   Systemic Shortness of Breath, Rash Latex  06/14/2010    Rash Tape [Adhesive]  03/28/2018    Rash Current Immunizations  Never Reviewed No immunizations on file. Not reviewed this visit You Were Diagnosed With   
  
 Codes Comments Anxiety    -  Primary ICD-10-CM: F41.9 ICD-9-CM: 300.00 Vitals BP Pulse Height(growth percentile) Weight(growth percentile) SpO2 BMI  
 128/80 72 5' 3\" (1.6 m) 205 lb (93 kg) 98% 36.31 kg/m2 OB Status Smoking Status Having regular periods Former Smoker Vitals History BMI and BSA Data Body Mass Index Body Surface Area  
 36.31 kg/m 2 2.03 m 2 Preferred Pharmacy Pharmacy Name Phone [facility] [phone] Your Updated Medication List  
  
   
This list is accurate as of 4/10/18 10:40 AM.  Always use your most recent med list.  
  
  
  
  
 * albuterol 2.5 mg /3 mL (0.083 %) nebulizer solution Commonly known as:  PROVENTIL VENTOLIN  
3 mL by Nebulization route every four (4) hours as needed for Wheezing. * albuterol 90 mcg/actuation inhaler Commonly known as:  PROAIR HFA Take 2 Puffs by inhalation every four (4) hours as needed for Wheezing. ALPRAZolam 0.25 mg tablet Commonly known as:  Camila Mireles Take 1 Tab by mouth two (2) times daily as needed for Anxiety. Max Daily Amount: 0.5 mg.  
  
 budesonide-formoterol 160-4.5 mcg/actuation Hfaa Commonly known as:  SYMBICORT Take 2 Puffs by inhalation two (2) times a day. cefUROXime 500 mg tablet Commonly known as:  CEFTIN Take 1 Tab by mouth two (2) times a day. ergocalciferol 50,000 unit capsule Commonly known as:  ERGOCALCIFEROL Take 1 Cap by mouth every seven (7) days. escitalopram oxalate 5 mg tablet Commonly known as:  Ayala Calk Take 1 Tab by mouth daily. FLONASE 50 mcg/actuation nasal spray Generic drug:  fluticasone 2 Sprays by Both Nostrils route daily. Nebulizer & Compressor machine Patient already have instructions how to use. * Notice: This list has 2 medication(s) that are the same as other medications prescribed for you. Read the directions carefully, and ask your doctor or other care provider to review them with you. Prescriptions Printed Refills ALPRAZolam (XANAX) 0.25 mg tablet 0 Sig: Take 1 Tab by mouth two (2) times daily as needed for Anxiety. Max Daily Amount: 0.5 mg.  
 Class: Print Route: Oral  
  
Follow-up Instructions Return in about 6 weeks (around 5/22/2018). Patient Instructions Anxiety Disorder: Care Instructions Your Care Instructions Anxiety is a normal reaction to stress. Difficult situations can cause you to have symptoms such as sweaty palms and a nervous feeling. In an anxiety disorder, the symptoms are far more severe. Constant worry, muscle tension, trouble sleeping, nausea and diarrhea, and other symptoms can make normal daily activities difficult or impossible.  These symptoms may occur for no reason, and they can affect your work, school, or social life. Medicines, counseling, and self-care can all help. Follow-up care is a key part of your treatment and safety. Be sure to make and go to all appointments, and call your doctor if you are having problems. It's also a good idea to know your test results and keep a list of the medicines you take. How can you care for yourself at home? · Take medicines exactly as directed. Call your doctor if you think you are having a problem with your medicine. · Go to your counseling sessions and follow-up appointments. · Recognize and accept your anxiety. Then, when you are in a situation that makes you anxious, say to yourself, \"This is not an emergency. I feel uncomfortable, but I am not in danger. I can keep going even if I feel anxious. \" · Be kind to your body: ¨ Relieve tension with exercise or a massage. ¨ Get enough rest. 
¨ Avoid alcohol, caffeine, nicotine, and illegal drugs. They can increase your anxiety level and cause sleep problems. ¨ Learn and do relaxation techniques. See below for more about these techniques. · Engage your mind. Get out and do something you enjoy. Go to a funny movie, or take a walk or hike. Plan your day. Having too much or too little to do can make you anxious. · Keep a record of your symptoms. Discuss your fears with a good friend or family member, or join a support group for people with similar problems. Talking to others sometimes relieves stress. · Get involved in social groups, or volunteer to help others. Being alone sometimes makes things seem worse than they are. · Get at least 30 minutes of exercise on most days of the week to relieve stress. Walking is a good choice. You also may want to do other activities, such as running, swimming, cycling, or playing tennis or team sports. Relaxation techniques Do relaxation exercises 10 to 20 minutes a day.  You can play soothing, relaxing music while you do them, if you wish. · Tell others in your house that you are going to do your relaxation exercises. Ask them not to disturb you. · Find a comfortable place, away from all distractions and noise. · Lie down on your back, or sit with your back straight. · Focus on your breathing. Make it slow and steady. · Breathe in through your nose. Breathe out through either your nose or mouth. · Breathe deeply, filling up the area between your navel and your rib cage. Breathe so that your belly goes up and down. · Do not hold your breath. · Breathe like this for 5 to 10 minutes. Notice the feeling of calmness throughout your whole body. As you continue to breathe slowly and deeply, relax by doing the following for another 5 to 10 minutes: · Tighten and relax each muscle group in your body. You can begin at your toes and work your way up to your head. · Imagine your muscle groups relaxing and becoming heavy. · Empty your mind of all thoughts. · Let yourself relax more and more deeply. · Become aware of the state of calmness that surrounds you. · When your relaxation time is over, you can bring yourself back to alertness by moving your fingers and toes and then your hands and feet and then stretching and moving your entire body. Sometimes people fall asleep during relaxation, but they usually wake up shortly afterward. · Always give yourself time to return to full alertness before you drive a car or do anything that might cause an accident if you are not fully alert. Never play a relaxation tape while you drive a car. When should you call for help? Call 911 anytime you think you may need emergency care. For example, call if: 
? · You feel you cannot stop from hurting yourself or someone else. ? Keep the numbers for these national suicide hotlines: 4-393-495-TALK (0-312.188.6698) and 3-190-KEIDPXR (5-618.627.4374).  If you or someone you know talks about suicide or feeling hopeless, get help right away. ? Watch closely for changes in your health, and be sure to contact your doctor if: 
? · You have anxiety or fear that affects your life. ? · You have symptoms of anxiety that are new or different from those you had before. Where can you learn more? Go to http://sanjay-davis.info/. Enter P754 in the search box to learn more about \"Anxiety Disorder: Care Instructions. \" Current as of: May 12, 2017 Content Version: 11.4 © 2221-0437 [employer]. Care instructions adapted under license by [employer] (which disclaims liability or warranty for this information). If you have questions about a medical condition or this instruction, always ask your healthcare professional. [address] any warranty or liability for your use of this information. Introducing [facility]! [facility] introduces Needly patient portal. Now you can access parts of your medical record, email your doctor's office, and request medication refills online. 1. In your internet browser, go to [email or web address] 2. Click on the First Time User? Click Here link in the Sign In box. You will see the New Member Sign Up page. 3. Enter your Needly Access Code exactly as it appears below. You will not need to use this code after youve completed the sign-up process. If you do not sign up before the expiration date, you must request a new code. · Needly Access Code: [email or web address] Expires: 6/26/2018  8:00 AM 
 
4. Enter the last four digits of your Social Security Number (xxxx) and Date of Birth (mm/dd/yyyy) as indicated and click Submit. You will be taken to the next sign-up page. 5. Create a Needly ID. This will be your Needly login ID and cannot be changed, so think of one that is secure and easy to remember. 6. Create a BRAINDIGIT password. You can change your password at any time. 7. Enter your Password Reset Question and Answer. This can be used at a later time if you forget your password. 8. Enter your e-mail address. You will receive e-mail notification when new information is available in [address] 9. Click Sign Up. You can now view and download portions of your medical record. 10. Click the Download Summary menu link to download a portable copy of your medical information. If you have questions, please visit the Frequently Asked Questions section of the BRAINDIGIT website. Remember, BRAINDIGIT is NOT to be used for urgent needs. For medical emergencies, dial 911. Now available from your iPhone and Android! Please provide this summary of care documentation to your next provider. Your primary care clinician is listed as Justin Major. If you have any questions after today's visit, please call [phone].

## 2018-04-16 ENCOUNTER — TELEPHONE (OUTPATIENT)
Dept: INTERNAL MEDICINE CLINIC | Age: 43
End: 2018-04-16

## 2018-04-16 RX ORDER — ESCITALOPRAM OXALATE 5 MG/1
5 TABLET ORAL DAILY
Qty: 90 TAB | Refills: 3 | Status: SHIPPED | OUTPATIENT
Start: 2018-04-16 | End: 2018-04-17 | Stop reason: SDUPTHER

## 2018-04-16 RX ORDER — MONTELUKAST SODIUM 10 MG/1
10 TABLET ORAL DAILY
Qty: 90 TAB | Refills: 3 | Status: SHIPPED | OUTPATIENT
Start: 2018-04-16 | End: 2018-04-17 | Stop reason: SDUPTHER

## 2018-04-16 NOTE — TELEPHONE ENCOUNTER
Jin Squires needs new prescription for Lexapro 10 mg daily per office visit from 04/10/2018 (patient states she only has 3 pills left). Patient also states she needs a refill of her Singulair 10 mg (last recorded 3/14/2017).     ([facility] on [address] [place] [address] [place]:  [phone])

## 2018-04-17 ENCOUNTER — TELEPHONE (OUTPATIENT)
Dept: INTERNAL MEDICINE CLINIC | Age: 43
End: 2018-04-17

## 2018-04-17 RX ORDER — MONTELUKAST SODIUM 10 MG/1
10 TABLET ORAL DAILY
Qty: 30 TAB | Refills: 0 | Status: SHIPPED | OUTPATIENT
Start: 2018-04-17 | End: 2018-05-14 | Stop reason: SDUPTHER

## 2018-04-17 RX ORDER — ESCITALOPRAM OXALATE 5 MG/1
5 TABLET ORAL DAILY
Qty: 30 TAB | Refills: 0 | Status: SHIPPED | OUTPATIENT
Start: 2018-04-17 | End: 2018-05-03 | Stop reason: SDUPTHER

## 2018-04-17 NOTE — TELEPHONE ENCOUNTER
Savage Escobar phoned in to the office on 04/16/2018 stating she needed a new prescription for Lexapro 10 mg daily per office visit from 04/10/2018 (patient stated she only had 3 pills left at that time) AND also needed a refill of her Singulair 10 mg (last recorded 3/14/2017) for which Dr. Usman Lacey sent both prescriptions to [facility] on [address] [place] [address] ([phone]). Patient phoned the office back today, 04/17/2018 stating [facility] computers were down yesterday, & they did not receive her prescriptions. Patient needs Lexapro 10 mg & Singulair 10 mg to be resent to [facility] on [address] [place] [address] ([phone]).

## 2018-05-03 ENCOUNTER — TELEPHONE (OUTPATIENT)
Dept: INTERNAL MEDICINE CLINIC | Age: 43
End: 2018-05-03

## 2018-05-03 RX ORDER — ESCITALOPRAM OXALATE 10 MG/1
10 TABLET ORAL DAILY
Qty: 90 TAB | Refills: 2 | Status: SHIPPED | OUTPATIENT
Start: 2018-05-03 | End: 2019-02-26

## 2018-05-03 NOTE — TELEPHONE ENCOUNTER
Jacqueline Annabel phoned the office stating she is needing a new prescription for Lexapro 10 mg (which is different than what is in her file due to medication increase). Patient may be reached at [phone] (D).

## 2018-05-07 NOTE — TELEPHONE ENCOUNTER
Lexapro 10 mg called in to [facility] in [place] on [address] [place] [address].   Receipt confirmed by pharmacy on 5/3/2018 at 1:24 PM.

## 2018-05-14 RX ORDER — MONTELUKAST SODIUM 10 MG/1
10 TABLET ORAL DAILY
Qty: 90 TAB | Refills: 3 | Status: SHIPPED | OUTPATIENT
Start: 2018-05-14

## 2018-06-12 ENCOUNTER — TELEPHONE (OUTPATIENT)
Dept: INTERNAL MEDICINE CLINIC | Age: 43
End: 2018-06-12

## 2018-06-12 DIAGNOSIS — F41.9 ANXIETY: ICD-10-CM

## 2018-06-12 RX ORDER — ALPRAZOLAM 0.25 MG/1
0.25 TABLET ORAL
Qty: 60 TAB | Refills: 0 | Status: SHIPPED | OUTPATIENT
Start: 2018-06-12 | End: 2019-02-26 | Stop reason: SDUPTHER

## 2018-06-12 NOTE — TELEPHONE ENCOUNTER
Pharmacy called requesting refills on patient's Vitamin D 50,000 units & Xanax . 25 mg. Patient does not have a followup appointment at this time. I authorized the Vitamin D for 1 refill with note that she needs appointment. Advised that the Xanax script needed to be picked up from the office, and that patient needed a followup appointment.

## 2018-08-29 ENCOUNTER — DOCUMENTATION ONLY (OUTPATIENT)
Dept: INTERNAL MEDICINE CLINIC | Age: 43
End: 2018-08-29

## 2018-10-08 ENCOUNTER — OFFICE VISIT (OUTPATIENT)
Dept: INTERNAL MEDICINE CLINIC | Age: 43
End: 2018-10-08

## 2018-10-08 VITALS
HEIGHT: 63 IN | HEART RATE: 80 BPM | DIASTOLIC BLOOD PRESSURE: 84 MMHG | SYSTOLIC BLOOD PRESSURE: 123 MMHG | WEIGHT: 204 LBS | OXYGEN SATURATION: 96 % | BODY MASS INDEX: 36.14 KG/M2 | TEMPERATURE: 99.2 F

## 2018-10-08 DIAGNOSIS — S99.912A INJURY OF LEFT ANKLE, INITIAL ENCOUNTER: ICD-10-CM

## 2018-10-08 DIAGNOSIS — J20.9 ACUTE BRONCHITIS, UNSPECIFIED ORGANISM: Primary | ICD-10-CM

## 2018-10-08 RX ORDER — CEFUROXIME AXETIL 500 MG/1
500 TABLET ORAL 2 TIMES DAILY
Qty: 14 TAB | Refills: 0 | Status: SHIPPED | OUTPATIENT
Start: 2018-10-08 | End: 2019-02-26 | Stop reason: ALTCHOICE

## 2018-10-08 RX ORDER — METHYLPREDNISOLONE 4 MG/1
4 TABLET ORAL
Qty: 1 DOSE PACK | Refills: 0 | Status: SHIPPED | OUTPATIENT
Start: 2018-10-08 | End: 2019-02-26 | Stop reason: ALTCHOICE

## 2018-10-08 RX ORDER — MELOXICAM 15 MG/1
15 TABLET ORAL DAILY
Qty: 20 TAB | Refills: 0 | Status: SHIPPED | OUTPATIENT
Start: 2018-10-08 | End: 2018-11-15 | Stop reason: SDUPTHER

## 2018-10-08 RX ORDER — HYDROCODONE POLISTIREX AND CHLORPHENIRAMINE POLISTIREX 10; 8 MG/5ML; MG/5ML
1 SUSPENSION, EXTENDED RELEASE ORAL
Qty: 100 ML | Refills: 0 | Status: SHIPPED | OUTPATIENT
Start: 2018-10-08 | End: 2018-10-18

## 2018-10-08 NOTE — PROGRESS NOTES
Brett Old presents with   Chief Complaint   Patient presents with    Cough    Nasal Discharge    Fever    Ankle Injury     left ankle   Patient here with complaint of cough, nasal drainage, fever & chills since Saturday. Fever in office today is 99.2. Also has complaint of left ankle injury 3 weeks ago when she slipped & twisted it. No treatment. 1. Have you been to the ER, urgent care clinic since your last visit? Hospitalized since your last visit? No    2. Have you seen or consulted any other health care providers outside of the [address] [place] [address] since your last visit? Include any pap smears or colon screening.  No

## 2018-10-08 NOTE — PATIENT INSTRUCTIONS
Bronchitis: Care Instructions  Your Care Instructions    Bronchitis is inflammation of the bronchial tubes, which carry air to the lungs. The tubes swell and produce mucus, or phlegm. The mucus and inflamed bronchial tubes make you cough. You may have trouble breathing. Most cases of bronchitis are caused by viruses like those that cause colds. Antibiotics usually do not help and they may be harmful. Bronchitis usually develops rapidly and lasts about 2 to 3 weeks in otherwise healthy people. Follow-up care is a key part of your treatment and safety. Be sure to make and go to all appointments, and call your doctor if you are having problems. It's also a good idea to know your test results and keep a list of the medicines you take. How can you care for yourself at home? · Take all medicines exactly as prescribed. Call your doctor if you think you are having a problem with your medicine. · Get some extra rest.  · Take an over-the-counter pain medicine, such as acetaminophen (Tylenol), ibuprofen (Advil, Motrin), or naproxen (Aleve) to reduce fever and relieve body aches. Read and follow all instructions on the label. · Do not take two or more pain medicines at the same time unless the doctor told you to. Many pain medicines have acetaminophen, which is Tylenol. Too much acetaminophen (Tylenol) can be harmful. · Take an over-the-counter cough medicine that contains dextromethorphan to help quiet a dry, hacking cough so that you can sleep. Avoid cough medicines that have more than one active ingredient. Read and follow all instructions on the label. · Breathe moist air from a humidifier, hot shower, or sink filled with hot water. The heat and moisture will thin mucus so you can cough it out. · Do not smoke. Smoking can make bronchitis worse. If you need help quitting, talk to your doctor about stop-smoking programs and medicines. These can increase your chances of quitting for good.   When should you call for help? Call 911 anytime you think you may need emergency care. For example, call if:    · You have severe trouble breathing.    Call your doctor now or seek immediate medical care if:    · You have new or worse trouble breathing.     · You cough up dark brown or bloody mucus (sputum).     · You have a new or higher fever.     · You have a new rash.    Watch closely for changes in your health, and be sure to contact your doctor if:    · You cough more deeply or more often, especially if you notice more mucus or a change in the color of your mucus.     · You are not getting better as expected. Where can you learn more? Go to http://sanjay-davis.info/. Enter H333 in the search box to learn more about \"Bronchitis: Care Instructions. \"  Current as of: December 6, 2017  Content Version: 11.8  © 1267-6205 [employer]. Care instructions adapted under license by [employer] (which disclaims liability or warranty for this information). If you have questions about a medical condition or this instruction, always ask your healthcare professional. [address] any warranty or liability for your use of this information.

## 2018-10-08 NOTE — MR AVS SNAPSHOT
[facility] [address] [id number] [phone] Patient: Tessa Rahman MRN: [id number] DAMIEN:[id number] Visit Information Date & Time Provider Department Dept. Phone Encounter #  
 10/8/2018  3:00 PM Tamara Zimmer NP [address] [place] [address] [phone] [id number] Follow-up Instructions Return if symptoms worsen or fail to improve. Upcoming Health Maintenance Date Due Pneumococcal 19-64 Medium Risk (1 of 1 - PPSV23) 9/11/1994 DTaP/Tdap/Td series (1 - Tdap) 9/11/1996 PAP AKA CERVICAL CYTOLOGY 9/11/1996 Influenza Age 5 to Adult 8/1/2018 Allergies as of 10/8/2018  Review Complete On: 10/8/2018 By: Tamara Zimmer NP Severity Noted Reaction Type Reactions Latex Medium 07/02/2010   Systemic Shortness of Breath, Rash Latex  06/14/2010    Rash Tape [Adhesive]  03/28/2018    Rash Current Immunizations  Never Reviewed No immunizations on file. Not reviewed this visit You Were Diagnosed With   
  
 Codes Comments Acute bronchitis, unspecified organism    -  Primary ICD-10-CM: J20.9 ICD-9-CM: 466.0 Injury of left ankle, initial encounter     ICD-10-CM: E54.455Y ICD-9-CM: 023. 7 Vitals BP Pulse Temp Height(growth percentile) Weight(growth percentile) SpO2  
 123/84 (BP 1 Location: Left arm, BP Patient Position: Sitting) 80 99.2 °F (37.3 °C) (Oral) 5' 3\" (1.6 m) 204 lb (92.5 kg) 96% BMI OB Status Smoking Status 36.14 kg/m2 Having regular periods Former Smoker BMI and BSA Data Body Mass Index Body Surface Area  
 36.14 kg/m 2 2.03 m 2 Preferred Pharmacy Pharmacy Name Phone [id number] [address] [phone] Your Updated Medication List  
  
   
This list is accurate as of 10/8/18  5:06 PM.  Always use your most recent med list.  
  
  
  
  
 * albuterol 2.5 mg /3 mL (0.083 %) nebulizer solution Commonly known as:  PROVENTIL VENTOLIN  
3 mL by Nebulization route every four (4) hours as needed for Wheezing. * albuterol 90 mcg/actuation inhaler Commonly known as:  PROAIR HFA Take 2 Puffs by inhalation every four (4) hours as needed for Wheezing. ALPRAZolam 0.25 mg tablet Commonly known as:  Isamar Garcia Take 1 Tab by mouth two (2) times daily as needed for Anxiety. Max Daily Amount: 0.5 mg.  
  
 budesonide-formoterol 160-4.5 mcg/actuation Hfaa Commonly known as:  SYMBICORT Take 2 Puffs by inhalation two (2) times a day. cefUROXime 500 mg tablet Commonly known as:  CEFTIN Take 1 Tab by mouth two (2) times a day. chlorpheniramine-HYDROcodone 10-8 mg/5 mL suspension Commonly known as:  Festus Franklin Take 5 mL by mouth every twelve (12) hours as needed for Cough for up to 10 days. Max Daily Amount: 10 mL. ergocalciferol 50,000 unit capsule Commonly known as:  ERGOCALCIFEROL Take 1 Cap by mouth every seven (7) days. escitalopram oxalate 10 mg tablet Commonly known as:  Cliffton Craze Take 1 Tab by mouth daily. FLONASE 50 mcg/actuation nasal spray Generic drug:  fluticasone 2 Sprays by Both Nostrils route daily. meloxicam 15 mg tablet Commonly known as:  MOBIC Take 1 Tab by mouth daily. methylPREDNISolone 4 mg tablet Commonly known as:  Corlis Homes Take 1 Tab by mouth Specific Days and Specific Times. montelukast 10 mg tablet Commonly known as:  SINGULAIR Take 1 Tab by mouth daily. Nebulizer & Compressor machine Patient already have instructions how to use. * Notice: This list has 2 medication(s) that are the same as other medications prescribed for you. Read the directions carefully, and ask your doctor or other care provider to review them with you. Prescriptions Printed Refills chlorpheniramine-HYDROcodone (TUSSIONEX) 10-8 mg/5 mL suspension 0 Sig: Take 5 mL by mouth every twelve (12) hours as needed for Cough for up to 10 days. Max Daily Amount: 10 mL. Class: Print Route: Oral  
  
Prescriptions Sent to Pharmacy Refills  
 cefUROXime (CEFTIN) 500 mg tablet 0 Sig: Take 1 Tab by mouth two (2) times a day. Class: Normal  
 Pharmacy: [id number]- [address] Ph #: [phone] Route: Oral  
 methylPREDNISolone (MEDROL DOSEPACK) 4 mg tablet 0 Sig: Take 1 Tab by mouth Specific Days and Specific Times. Class: Normal  
 Pharmacy: [facility]/pharmacy #[id number]- [address] Ph #: [phone] Route: Oral  
 meloxicam (MOBIC) 15 mg tablet 0 Sig: Take 1 Tab by mouth daily. Class: Normal  
 Pharmacy: [facility]/pharmacy #[id number]- [address] Ph #: [phone] Route: Oral  
  
Follow-up Instructions Return if symptoms worsen or fail to improve. To-Do List   
 10/08/2018 Imaging:  XR ANKLE LT MIN 3 V Patient Instructions Bronchitis: Care Instructions Your Care Instructions Bronchitis is inflammation of the bronchial tubes, which carry air to the lungs. The tubes swell and produce mucus, or phlegm. The mucus and inflamed bronchial tubes make you cough. You may have trouble breathing. Most cases of bronchitis are caused by viruses like those that cause colds. Antibiotics usually do not help and they may be harmful. Bronchitis usually develops rapidly and lasts about 2 to 3 weeks in otherwise healthy people. Follow-up care is a key part of your treatment and safety. Be sure to make and go to all appointments, and call your doctor if you are having problems. It's also a good idea to know your test results and keep a list of the medicines you take. How can you care for yourself at home? · Take all medicines exactly as prescribed. Call your doctor if you think you are having a problem with your medicine. · Get some extra rest. 
· Take an over-the-counter pain medicine, such as acetaminophen (Tylenol), ibuprofen (Advil, Motrin), or naproxen (Aleve) to reduce fever and relieve body aches. Read and follow all instructions on the label. · Do not take two or more pain medicines at the same time unless the doctor told you to. Many pain medicines have acetaminophen, which is Tylenol. Too much acetaminophen (Tylenol) can be harmful. · Take an over-the-counter cough medicine that contains dextromethorphan to help quiet a dry, hacking cough so that you can sleep. Avoid cough medicines that have more than one active ingredient. Read and follow all instructions on the label. · Breathe moist air from a humidifier, hot shower, or sink filled with hot water. The heat and moisture will thin mucus so you can cough it out. · Do not smoke. Smoking can make bronchitis worse. If you need help quitting, talk to your doctor about stop-smoking programs and medicines. These can increase your chances of quitting for good. When should you call for help? Call 911 anytime you think you may need emergency care. For example, call if: 
  · You have severe trouble breathing.  
 Call your doctor now or seek immediate medical care if: 
  · You have new or worse trouble breathing.  
  · You cough up dark brown or bloody mucus (sputum).  
  · You have a new or higher fever.  
  · You have a new rash.  
 Watch closely for changes in your health, and be sure to contact your doctor if: 
  · You cough more deeply or more often, especially if you notice more mucus or a change in the color of your mucus.  
  · You are not getting better as expected. Where can you learn more? Go to http://sanjay-davis.info/. Enter H333 in the search box to learn more about \"Bronchitis: Care Instructions. \" Current as of: December 6, 2017 Content Version: 11.8 © 2182-9936 [employer]. Care instructions adapted under license by [employer] (which disclaims liability or warranty for this information). If you have questions about a medical condition or this instruction, always ask your healthcare professional. [address] any warranty or liability for your use of this information. Introducing [facility]! [employer] introduces [employer] patient portal. Now you can access parts of your medical record, email your doctor's office, and request medication refills online. 1. In your internet browser, go to [email or web address] [email or web address] 2. Click on the First Time User? Click Here link in the Sign In box. You will see the New Member Sign Up page. 3. Enter your [employer] Access Code exactly as it appears below. You will not need to use this code after youve completed the sign-up process. If you do not sign up before the expiration date, you must request a new code. · [employer] Access Code: [facility] Expires: 1/6/2019  3:40 PM 
 
4. Enter the last four digits of your Social Security Number (xxxx) and Date of Birth (mm/dd/yyyy) as indicated and click Submit. You will be taken to the next sign-up page. 5. Create a [employer] ID. This will be your [employer] login ID and cannot be changed, so think of one that is secure and easy to remember. 6. Create a [employer] password. You can change your password at any time. 7. Enter your Password Reset Question and Answer. This can be used at a later time if you forget your password. 8. Enter your e-mail address. You will receive e-mail notification when new information is available in [address] 9. Click Sign Up. You can now view and download portions of your medical record. 10. Click the Download Summary menu link to download a portable copy of your medical information. If you have questions, please visit the Frequently Asked Questions section of the "[employer] website. Remember, "[employer] is NOT to be used for urgent needs. For medical emergencies, dial 911. Now available from your iPhone and Android! Please provide this summary of care documentation to your next provider. Your primary care clinician is listed as Kira Mijares. If you have any questions after today's visit, please call [phone].

## 2018-10-08 NOTE — PROGRESS NOTES
Subjective:  Ms. Amy Flores is a pleasant 37year old lady who comes in today with a four day history of what started out as some nasal congestion, postnasal drainage and scratchy throat that has now settled down in her chest.  She is coughing up greenish sputum. She's had some mild shortness of breath and wheezing. She denies any hemoptysis. She's had chills and fever. Denies any nausea or vomiting. Does have a history of asthma, for which she is currently on Symbicort, along with Albuterol as needed. In addition today she gives a three week history of left ankle pain. All of her difficulties started when she twisted her left ankle. All of her discomfort is located on the lateral border of that left ankle. She does not note much swelling. It is painful with ambulation. Studies: Three views of the left ankle fail to reveal any fracture or dislocation. Impression:  1. Acute bronchitis. 2. Sprain, left ankle. Plan:  1. In terms of her bronchitis it was opted to start Ceftin 500 mg twice daily for seven days, along with Medrol Dosepak. 2. I did give her a rx for Tussionex, to take one teaspoon every 12 hours as needed for cough. 3. She is to increase fluids and rest.  4. She will contact us should she fail to improve or if her condition worsens. 5. In terms of her foot, I'm starting her on Meloxicam 15 mg, one tablet daily with meal for next ten days. Should that fail to improve then we should considering referring her to Dr. Kristen Allison at [address] [place] [address]. She is in agreement.             Past Medical History:   Diagnosis Date    Annual physical exam 10/10/2017    Asthma     Crohn disease ([facility] [address] 10/10/2017    Crohn's disease ([facility] [address]     Depression     Elevated LFTs 10/10/2017    Environmental allergies     Fatty liver     Foot pain 10/10/2017    Gastrointestinal disorder     Crohns    Migraine     Osteoporosis screening 10/10/2017    Panic attacks     Screening for diabetes mellitus 10/10/2017    Screening for hyperlipidemia 10/10/2017    Screening for hypothyroidism 10/10/2017    Unspecified vitamin D deficiency     Vitamin B 12 deficiency 10/10/2017    Vitamin B12 deficiency     Vitamin D deficiency 10/10/2017     Past Surgical History:   Procedure Laterality Date    ABDOMEN SURGERY PROC UNLISTED      colon resection    HX APPENDECTOMY      HX OTHER SURGICAL  1999    terminal ileum resected    HX OTHER SURGICAL  1999    bladder reconstruction due to fistula    HX UROLOGICAL      bladder       Current Outpatient Prescriptions on File Prior to Visit   Medication Sig Dispense Refill    ALPRAZolam (XANAX) 0.25 mg tablet Take 1 Tab by mouth two (2) times daily as needed for Anxiety. Max Daily Amount: 0.5 mg. 60 Tab 0    montelukast (SINGULAIR) 10 mg tablet Take 1 Tab by mouth daily. 90 Tab 3    escitalopram oxalate (LEXAPRO) 10 mg tablet Take 1 Tab by mouth daily. 90 Tab 2    ergocalciferol (ERGOCALCIFEROL) 50,000 unit capsule Take 1 Cap by mouth every seven (7) days. 12 Cap 0    albuterol (PROVENTIL VENTOLIN) 2.5 mg /3 mL (0.083 %) nebulizer solution 3 mL by Nebulization route every four (4) hours as needed for Wheezing. 1 Package 3    albuterol (PROAIR HFA) 90 mcg/actuation inhaler Take 2 Puffs by inhalation every four (4) hours as needed for Wheezing. 1 Inhaler 3    budesonide-formoterol (SYMBICORT) 160-4.5 mcg/actuation HFAA Take 2 Puffs by inhalation two (2) times a day. 1 Inhaler 6    fluticasone (FLONASE) 50 mcg/actuation nasal spray 2 Sprays by Both Nostrils route daily.  Nebulizer & Compressor machine Patient already have instructions how to use. 1 Each 0     No current facility-administered medications on file prior to visit. Allergies   Allergen Reactions    Latex Shortness of Breath and Rash    Latex Rash    Tape [Adhesive] Rash     Studies: Three views of the left ankle fail to reveal any fracture or dislocation. Impression:  3.  Acute bronchitis. 4. Sprain, left ankle. Plan:  6. In terms of her bronchitis it was opted to start Ceftin 500 mg twice daily for seven days, along with Medrol Dosepak. 7. I did give her a rx for Tussionex, to take one teaspoon every 12 hours as needed for cough. 8. She is to increase fluids and rest.  9. She will contact us should she fail to improve or if her condition worsens. 10. In terms of her foot, I'm starting her on Meloxicam 15 mg, one tablet daily with meal for next ten days. Should that fail to improve then we should considering referring her to Dr. Pedro Oakley at [address] [place] [address]. She is in agreement.

## 2018-10-16 DIAGNOSIS — E55.9 VITAMIN D DEFICIENCY: Primary | ICD-10-CM

## 2018-10-25 ENCOUNTER — OFFICE VISIT (OUTPATIENT)
Dept: INTERNAL MEDICINE CLINIC | Age: 43
End: 2018-10-25

## 2018-10-25 VITALS
DIASTOLIC BLOOD PRESSURE: 82 MMHG | SYSTOLIC BLOOD PRESSURE: 133 MMHG | BODY MASS INDEX: 35.97 KG/M2 | WEIGHT: 203 LBS | HEART RATE: 85 BPM | OXYGEN SATURATION: 98 % | HEIGHT: 63 IN

## 2018-10-25 DIAGNOSIS — S99.912A INJURY OF LEFT ANKLE, INITIAL ENCOUNTER: Primary | ICD-10-CM

## 2018-10-25 NOTE — LETTER
NOTIFICATION RETURN TO WORK / SCHOOL 
 
10/25/2018 3:08 PM 
 
Ms. [address] [facility] [address] [id number] To Whom It May Concern: 
 
Lauri Leon is currently under the care of Rosa [facility]. She mayl return to work/school on: 10/26/18 with light duty restrictions until seen by Orthopaedics. If there are questions or concerns please have the patient contact our office.  
 
 
 
Sincerely, 
 
 
Brad Huggins NP

## 2018-10-25 NOTE — PROGRESS NOTES
Gabrielle Parnell presents with   Chief Complaint   Patient presents with    Ankle Pain     left   Patient returns with complaint of continued left ankle pain. States it is a constant 8 out of 10 on pain scale. 1. Have you been to the ER, urgent care clinic since your last visit? Hospitalized since your last visit? No    2. Have you seen or consulted any other health care providers outside of the [address] [place] [address] since your last visit? Include any pap smears or colon screening.  No

## 2018-10-25 NOTE — PATIENT INSTRUCTIONS
Ankle: Exercises  Your Care Instructions  Here are some examples of exercises for your ankle. Start each exercise slowly. Ease off the exercise if you start to have pain. Your doctor or physical therapist will tell you when you can start these exercises and which ones will work best for you. How to do the exercises  \"Alphabet\" exercise    1. Trace the alphabet with your toe. This helps your ankle move in all directions. Side-to-side knee swing exercise    1. Sit in a chair with your foot flat on the floor. 2. Slowly move your knee from side to side while keeping your foot pressed flat. 3. Continue this exercise for 2 to 3 minutes. Towel curl    1. While sitting, place your foot on a towel on the floor and scrunch the towel toward you with your toes. 2. Then use your toes to push the towel away from you. 3. Make this exercise more challenging by placing a weighted object, such as a soup can, on the other end of the towel. Towel stretch    1. Sit with your legs extended and knees straight. 2. Place a towel around your foot just under the toes. 3. Hold each end of the towel in each hand, with your hands above your knees. 4. Pull back with the towel so that your foot stretches toward you. 5. Hold the position for at least 15 to 30 seconds. 6. Repeat 2 to 4 times a session, up to 5 sessions a day. Ankle eversion exercise    1. Start by sitting with your foot flat on the floor and pushing it outward against an immovable object such as the wall or heavy furniture. Hold for about 6 seconds, then relax. Repeat 8 to 12 times. 2. After you feel comfortable with this, try using rubber tubing looped around the outside of your feet for resistance. Push your foot out to the side against the tubing, and then count to 10 as you slowly bring your foot back to the middle. Repeat 8 to 12 times. Isometric opposition exercises    1. While sitting, put your feet together flat on the floor.   2. Press your injured foot inward against your other foot. Hold for about 6 seconds, and relax. Repeat 8 to 12 times. 3. Then place the heel of your other foot on top of the injured one. Push down with the top heel while trying to push up with your injured foot. Hold for about 6 seconds, and relax. Repeat 8 to 12 times. Follow-up care is a key part of your treatment and safety. Be sure to make and go to all appointments, and call your doctor if you are having problems. It's also a good idea to know your test results and keep a list of the medicines you take. Where can you learn more? Go to http://sanjay-davis.info/. Enter F037 in the search box to learn more about \"Ankle: Exercises. \"  Current as of: November 29, 2017  Content Version: 11.8  © 3379-4829 [employer]. Care instructions adapted under license by [employer] (which disclaims liability or warranty for this information). If you have questions about a medical condition or this instruction, always ask your healthcare professional. Sourav Squires any warranty or liability for your use of this information. Ankle: Exercises  Your Care Instructions  Here are some examples of exercises for your ankle. Start each exercise slowly. Ease off the exercise if you start to have pain. Your doctor or physical therapist will tell you when you can start these exercises and which ones will work best for you. How to do the exercises  \"Alphabet\" exercise    2. Trace the alphabet with your toe. This helps your ankle move in all directions. Side-to-side knee swing exercise    4. Sit in a chair with your foot flat on the floor. 5. Slowly move your knee from side to side while keeping your foot pressed flat. 6. Continue this exercise for 2 to 3 minutes. Towel curl    4. While sitting, place your foot on a towel on the floor and scrunch the towel toward you with your toes.   5. Then use your toes to push the towel away from you. 6. Make this exercise more challenging by placing a weighted object, such as a soup can, on the other end of the towel. Towel stretch    7. Sit with your legs extended and knees straight. 8. Place a towel around your foot just under the toes. 9. Hold each end of the towel in each hand, with your hands above your knees. 10. Pull back with the towel so that your foot stretches toward you. 11. Hold the position for at least 15 to 30 seconds. 12. Repeat 2 to 4 times a session, up to 5 sessions a day. Ankle eversion exercise    3. Start by sitting with your foot flat on the floor and pushing it outward against an immovable object such as the wall or heavy furniture. Hold for about 6 seconds, then relax. Repeat 8 to 12 times. 4. After you feel comfortable with this, try using rubber tubing looped around the outside of your feet for resistance. Push your foot out to the side against the tubing, and then count to 10 as you slowly bring your foot back to the middle. Repeat 8 to 12 times. Isometric opposition exercises    4. While sitting, put your feet together flat on the floor. 5. Press your injured foot inward against your other foot. Hold for about 6 seconds, and relax. Repeat 8 to 12 times. 6. Then place the heel of your other foot on top of the injured one. Push down with the top heel while trying to push up with your injured foot. Hold for about 6 seconds, and relax. Repeat 8 to 12 times. Follow-up care is a key part of your treatment and safety. Be sure to make and go to all appointments, and call your doctor if you are having problems. It's also a good idea to know your test results and keep a list of the medicines you take. Where can you learn more? Go to http://sanjay-davis.info/. Enter D316 in the search box to learn more about \"Ankle: Exercises. \"  Current as of: November 29, 2017  Content Version: 11.8  © 4500-2165 [employer], [facility].  Care instructions adapted under license by [employer] (which disclaims liability or warranty for this information). If you have questions about a medical condition or this instruction, always ask your healthcare professional. [address] any warranty or liability for your use of this information.

## 2018-10-25 NOTE — PROGRESS NOTES
Subjective:  Ms. Marianela Grier is a 37year old lady who comes in today complaining of persistent left ankle pain. She initially injured her left ankle back on 09/17/18, when she twisted the ankle. All of the discomfort is on the lateral border of that ankle. I saw her on 10/08/18, at which time three views of the left ankle failed to reveal any fracture of dislocation. She was treated conservatively with Meloxicam, however she has failed to improve. At her last visit I discussed referring her to Dr. Mikey Wei, but I think she misunderstood my information. She comes in complaining of persistent pain and swelling. Past Medical History:   Diagnosis Date    Annual physical exam 10/10/2017    Asthma     Crohn disease ([facility] [address] 10/10/2017    Crohn's disease ([facility] [address]     Depression     Elevated LFTs 10/10/2017    Environmental allergies     Fatty liver     Foot pain 10/10/2017    Gastrointestinal disorder     Crohns    Migraine     Osteoporosis screening 10/10/2017    Panic attacks     Screening for diabetes mellitus 10/10/2017    Screening for hyperlipidemia 10/10/2017    Screening for hypothyroidism 10/10/2017    Unspecified vitamin D deficiency     Vitamin B 12 deficiency 10/10/2017    Vitamin B12 deficiency     Vitamin D deficiency 10/10/2017     Past Surgical History:   Procedure Laterality Date    ABDOMEN SURGERY PROC UNLISTED      colon resection    HX APPENDECTOMY      HX OTHER SURGICAL  1999    terminal ileum resected    HX OTHER SURGICAL  1999    bladder reconstruction due to fistula    HX UROLOGICAL      bladder       Current Outpatient Medications on File Prior to Visit   Medication Sig Dispense Refill    montelukast (SINGULAIR) 10 mg tablet Take 1 Tab by mouth daily. 90 Tab 3    albuterol (PROVENTIL VENTOLIN) 2.5 mg /3 mL (0.083 %) nebulizer solution 3 mL by Nebulization route every four (4) hours as needed for Wheezing.  1 Package 3    albuterol (PROAIR HFA) 90 mcg/actuation inhaler Take 2 Puffs by inhalation every four (4) hours as needed for Wheezing. 1 Inhaler 3    budesonide-formoterol (SYMBICORT) 160-4.5 mcg/actuation HFAA Take 2 Puffs by inhalation two (2) times a day. 1 Inhaler 6    fluticasone (FLONASE) 50 mcg/actuation nasal spray 2 Sprays by Both Nostrils route daily.  Nebulizer & Compressor machine Patient already have instructions how to use. 1 Each 0    cefUROXime (CEFTIN) 500 mg tablet Take 1 Tab by mouth two (2) times a day. 14 Tab 0    methylPREDNISolone (MEDROL DOSEPACK) 4 mg tablet Take 1 Tab by mouth Specific Days and Specific Times. 1 Dose Pack 0    meloxicam (MOBIC) 15 mg tablet Take 1 Tab by mouth daily. 20 Tab 0    ALPRAZolam (XANAX) 0.25 mg tablet Take 1 Tab by mouth two (2) times daily as needed for Anxiety. Max Daily Amount: 0.5 mg. 60 Tab 0    escitalopram oxalate (LEXAPRO) 10 mg tablet Take 1 Tab by mouth daily. 90 Tab 2    ergocalciferol (ERGOCALCIFEROL) 50,000 unit capsule Take 1 Cap by mouth every seven (7) days. 12 Cap 0     No current facility-administered medications on file prior to visit. Allergies   Allergen Reactions    Latex Shortness of Breath and Rash    Latex Rash    Tape [Adhesive] Rash     GENERAL:  Pleasant young lady in no acute distress. She is alert and oriented. VITALS:  BP: 132/82. P: 85.  O2 sat: 98. NECK:  Supple without adenopathy. CHEST:  Lungs clear to auscultation, no rales or wheezes. CV:  Heart regular rhythm without murmur or gallop. EXTREMITIES: She does have swelling along the lateral border of that left ankle. She is tender to palpation. She definitely ambulates with a limp. She describes her pain as 8/10. Pulses are present. Sensation is preserved. Impression:  1. Status post left ankle sprain with persistent swelling and pain. Plan:  1. It was opted to refer her to [address] [place] [address]. She will be seen in the morning. In the meantime I advised her to limit her ambulation.

## 2018-11-05 RX ORDER — BUDESONIDE AND FORMOTEROL FUMARATE DIHYDRATE 160; 4.5 UG/1; UG/1
2 AEROSOL RESPIRATORY (INHALATION) 2 TIMES DAILY
Qty: 1 INHALER | Refills: 6 | Status: SHIPPED | OUTPATIENT
Start: 2018-11-05

## 2018-11-05 NOTE — TELEPHONE ENCOUNTER
Symbicort refill request from the pharmacy. Patient last seen 10/25/2018 without labs, and next appt. scheduled for 00/00/0000. Symbicort last refilled:  03/28/2018    Refill request is for 90 days supply.

## 2018-11-15 DIAGNOSIS — S99.912A INJURY OF LEFT ANKLE, INITIAL ENCOUNTER: ICD-10-CM

## 2018-11-15 RX ORDER — MELOXICAM 15 MG/1
15 TABLET ORAL DAILY
Qty: 30 TAB | Refills: 6 | Status: SHIPPED | OUTPATIENT
Start: 2018-11-15 | End: 2019-02-26 | Stop reason: ALTCHOICE

## 2019-02-26 ENCOUNTER — OFFICE VISIT (OUTPATIENT)
Dept: INTERNAL MEDICINE CLINIC | Age: 44
End: 2019-02-26

## 2019-02-26 VITALS
WEIGHT: 189 LBS | HEIGHT: 63 IN | BODY MASS INDEX: 33.49 KG/M2 | TEMPERATURE: 98.3 F | HEART RATE: 90 BPM | DIASTOLIC BLOOD PRESSURE: 88 MMHG | SYSTOLIC BLOOD PRESSURE: 127 MMHG | OXYGEN SATURATION: 98 %

## 2019-02-26 DIAGNOSIS — F32.A DEPRESSION, UNSPECIFIED DEPRESSION TYPE: ICD-10-CM

## 2019-02-26 DIAGNOSIS — E55.9 VITAMIN D DEFICIENCY: ICD-10-CM

## 2019-02-26 DIAGNOSIS — R53.83 FATIGUE, UNSPECIFIED TYPE: ICD-10-CM

## 2019-02-26 DIAGNOSIS — L21.9 SEBORRHEIC DERMATITIS OF SCALP: Primary | ICD-10-CM

## 2019-02-26 DIAGNOSIS — Z13.1 SCREENING FOR DIABETES MELLITUS: ICD-10-CM

## 2019-02-26 DIAGNOSIS — F41.9 ANXIETY: ICD-10-CM

## 2019-02-26 DIAGNOSIS — Z13.220 SCREENING FOR HYPERLIPIDEMIA: ICD-10-CM

## 2019-02-26 DIAGNOSIS — Z00.00 PHYSICAL EXAM: ICD-10-CM

## 2019-02-26 DIAGNOSIS — E53.8 VITAMIN B 12 DEFICIENCY: ICD-10-CM

## 2019-02-26 RX ORDER — BISMUTH SUBSALICYLATE 262 MG
1 TABLET,CHEWABLE ORAL DAILY
COMMUNITY
End: 2020-08-13 | Stop reason: ALTCHOICE

## 2019-02-26 RX ORDER — ALPRAZOLAM 0.25 MG/1
0.25 TABLET ORAL
Qty: 60 TAB | Refills: 0 | Status: SHIPPED | OUTPATIENT
Start: 2019-02-26 | End: 2019-12-04

## 2019-02-26 NOTE — PROGRESS NOTES
Subjective:  Ms. Liz Houston is a pleasant 37year old lady, who comes in today for an updated history and physical.    History of Present Illness:  She gives a six month history of itching on her scalp, mostly in the posterior aspect. She has changed her shampoo on numerous occasions and has used OTC cream without any improvement. She would like to be referred to a dermatologist.     Past Medical History:   Diagnosis Date    Annual physical exam 10/10/2017    Asthma     Crohn disease ([facility] [address] 10/10/2017    Crohn's disease ([facility] [address]     Depression     Elevated LFTs 10/10/2017    Environmental allergies     Fatty liver     Foot pain 10/10/2017    Gastrointestinal disorder     Crohns    Migraine     Osteoporosis screening 10/10/2017    Panic attacks     Screening for diabetes mellitus 10/10/2017    Screening for hyperlipidemia 10/10/2017    Screening for hypothyroidism 10/10/2017    Unspecified vitamin D deficiency     Vitamin B 12 deficiency 10/10/2017    Vitamin B12 deficiency     Vitamin D deficiency 10/10/2017     Past Surgical History:   Procedure Laterality Date    ABDOMEN SURGERY PROC UNLISTED      colon resection    HX APPENDECTOMY      HX OTHER SURGICAL  1999    terminal ileum resected    HX OTHER SURGICAL  1999    bladder reconstruction due to fistula    HX UROLOGICAL      bladder       Current Outpatient Medications on File Prior to Visit   Medication Sig Dispense Refill    multivitamin (ONE A DAY) tablet Take 1 Tab by mouth daily.  budesonide-formoterol (SYMBICORT) 160-4.5 mcg/actuation HFAA Take 2 Puffs by inhalation two (2) times a day. 1 Inhaler 6    montelukast (SINGULAIR) 10 mg tablet Take 1 Tab by mouth daily. 90 Tab 3    albuterol (PROVENTIL VENTOLIN) 2.5 mg /3 mL (0.083 %) nebulizer solution 3 mL by Nebulization route every four (4) hours as needed for Wheezing.  1 Package 3    albuterol (PROAIR HFA) 90 mcg/actuation inhaler Take 2 Puffs by inhalation every four (4) hours as needed for Wheezing. 1 Inhaler 3    fluticasone (FLONASE) 50 mcg/actuation nasal spray 2 Sprays by Both Nostrils route daily.  Nebulizer & Compressor machine Patient already have instructions how to use. 1 Each 0     No current facility-administered medications on file prior to visit. Allergies   Allergen Reactions    Latex Shortness of Breath and Rash    Latex Rash    Tape [Adhesive] Rash     Past Medical History/Surgeries:    1. Multiple D&Cs. 2. Right oophorectomy secondary to ectopic pregnancy. 3. Partial colectomy and resection of small intestines secondary to Crohn's disease. This was done by Dr. Zonia Parikh back in 2002. She has done well from that standpoint. Her last colonoscopy was done by Dr. Natacha Fernandez at [facility] in 2016. She has an upcoming appointment with Dr. Natacha Fernandez. 4.  with subsequent tubal ligation. Illnesses:  1. Crohn's disease as noted previously. This is currently managed with diet and prn Imodium. 2. Asthma. 3. Allergic rhinitis. 4. History of elevated blood pressure during pregnancy. This has not been a recurrent problem. 5. Chronic depression/anxiety. Family History:  Father is 59years of age, alive with cirrhosis  liver secondary to alcoholism. Mother is 77years of age, alive with history of cervical cancer. Two sisters are living and well. One sister has had mini strokes. Social History:  She lives with her partner of many years. She is a stay at home mom. She is contemplating working from home. She has a 1and a 11year old. Allergies:  Adhesive and latex, which caused hives. Allergy to onion. Medications:  As listed in [facility]. Review of Systems:  HEENT: She notes occasional headaches, but denies any dizziness or blurred vision. She does wear glasses. CVR:  Denies any chest pain or palpitations. Denies any syncopal episode. Denies shortness of breath unless bothered by asthma.   Denies cough, wheezing, hemoptysis, PND or orthopnea. Denies any ankle edema. GI:  Appetite is good, weight has gone down by almost 15 lbs in the last several weeks. Does have a normal bowel pattern without presence of blood in stools or melena. :  Denies any urinary symptoms. GYN:  She is getting ready to get a pelvic and pap in two weeks. Physical Examination:  GENERAL:  Pleasant lady in no acute distress. She is alert and oriented. VITALS:  BP: 127/88. P: 90.  T: 98.2. O2 sat: initially 93%, repeated by myself 98. WT: 189 lbs. HEENT:  Normocephalic, atraumatic. PERRLA, EOMI. TMs normal.  Mouth mucosa pink. Tongue midline. Pharynx normal.  NECK:  Supple without adenopathy, thyromegaly or carotid bruits. CHEST:  Lungs clear to auscultation, no rales or wheezes. CV:  Heart regular rhythm without murmur or gallop. ABDOMEN:  Soft, non tender, no organomegaly or masses. EXTREMITIES:  No edema or calf tenderness. Distal pulses were present. NEUROLOGIC:  Cranial nerves II-XII intact. Excellent strength in the upper and lower extremities against resistance. Sensation is preserved. Romberg is negative. Reflexes 2+ and symmetrical.  She had excellent coordination. SKIN:  She does have a scaly, erythematous rash in the posterior aspect of her scalp along her hairline. Impression:  1. Crohn's disease, status post partial colectomy and small intestine resection. 2. Asthma, stable. 3. Allergic rhinitis. 4. Chronic anxiety/depression. 5. Scalp dermatitis. Plan:  1. She will return in the morning for her fasting lab studies. 2. For her scalp dermatitis I am referring her to dermatology. 3. While in the office I did give her a refill of Alprazolam 0.25 mg twice daily as needed for anxiety. She has previously signed the substance control agreement.

## 2019-02-26 NOTE — PROGRESS NOTES
Jonas Mackenzie presents with   Chief Complaint   Patient presents with    Complete Physical   Patient here for a CPE. She is not fasting. ALLERGIC to Latex & Adhesive. Medications reviewed & updated. Medical, surgical, social and family history was reviewed & updated. 1. Have you been to the ER, urgent care clinic since your last visit? Hospitalized since your last visit? No    2. Have you seen or consulted any other health care providers outside of the [address] [place] [address] since your last visit? Include any pap smears or colon screening.  No

## 2019-02-26 NOTE — PATIENT INSTRUCTIONS

## 2019-02-27 ENCOUNTER — APPOINTMENT (OUTPATIENT)
Dept: INTERNAL MEDICINE CLINIC | Age: 44
End: 2019-02-27

## 2019-02-27 DIAGNOSIS — E53.8 VITAMIN B 12 DEFICIENCY: ICD-10-CM

## 2019-02-27 DIAGNOSIS — R53.83 FATIGUE, UNSPECIFIED TYPE: ICD-10-CM

## 2019-02-27 DIAGNOSIS — Z13.1 SCREENING FOR DIABETES MELLITUS: ICD-10-CM

## 2019-02-27 DIAGNOSIS — N39.0 URINARY TRACT INFECTION WITHOUT HEMATURIA, SITE UNSPECIFIED: Primary | ICD-10-CM

## 2019-02-27 DIAGNOSIS — Z00.00 PHYSICAL EXAM: ICD-10-CM

## 2019-02-27 DIAGNOSIS — E55.9 VITAMIN D DEFICIENCY: ICD-10-CM

## 2019-02-27 DIAGNOSIS — Z13.220 SCREENING FOR HYPERLIPIDEMIA: ICD-10-CM

## 2019-02-27 LAB
A-G RATIO,AGRAT: 1.6 RATIO
ALBUMIN SERPL-MCNC: 4.5 G/DL (ref 3.9–5.4)
ALP SERPL-CCNC: 82 U/L (ref 38–126)
ALT SERPL-CCNC: 64 U/L (ref 9–52)
ANION GAP SERPL CALC-SCNC: 12 MMOL/L
AST SERPL W P-5'-P-CCNC: 45 U/L (ref 14–36)
BACTERIA,BACTU: ABNORMAL
BILIRUB SERPL-MCNC: 0.6 MG/DL (ref 0.2–1.3)
BILIRUB UR QL: NEGATIVE
BUN SERPL-MCNC: 9 MG/DL (ref 7–17)
BUN/CREATININE RATIO,BUCR: 15 RATIO
CALCIUM SERPL-MCNC: 9.7 MG/DL (ref 8.4–10.2)
CHLORIDE SERPL-SCNC: 104 MMOL/L (ref 98–107)
CHOL/HDL RATIO,CHHD: 3 RATIO (ref 0–4)
CHOLEST SERPL-MCNC: 174 MG/DL (ref 0–200)
CLARITY: CLEAR
CO2 SERPL-SCNC: 27 MMOL/L (ref 22–32)
COLOR UR: ABNORMAL
CREAT SERPL-MCNC: 0.6 MG/DL (ref 0.7–1.2)
ERYTHROCYTE [DISTWIDTH] IN BLOOD BY AUTOMATED COUNT: 13.3 %
GLOBULIN,GLOB: 2.9
GLUCOSE 24H UR-MRATE: NEGATIVE G/(24.H)
GLUCOSE SERPL-MCNC: 99 MG/DL (ref 65–105)
HCT VFR BLD AUTO: 45.3 % (ref 37–51)
HDLC SERPL-MCNC: 53 MG/DL (ref 35–130)
HGB BLD-MCNC: 14.9 G/DL (ref 12–18)
HGB UR QL STRIP: NEGATIVE
KETONES UR QL STRIP.AUTO: NEGATIVE
LDL/HDL RATIO,LDHD: 2 RATIO
LDLC SERPL CALC-MCNC: 90 MG/DL (ref 0–130)
LEUKOCYTE ESTERASE: NEGATIVE
LYMPHOCYTES ABSOLUTE: 1.2 K/UL (ref 0.6–4.1)
LYMPHOCYTES NFR BLD: 35.9 % (ref 10–58.5)
MCH RBC QN AUTO: 28.3 PG (ref 26–32)
MCHC RBC AUTO-ENTMCNC: 32.9 G/DL (ref 30–36)
MCV RBC AUTO: 86.1 FL (ref 80–97)
MONOCYTES ABS-DIF,2141: 0.3 K/UL (ref 0–1.8)
MONOCYTES NFR BLD: 10.2 % (ref 0.1–24)
NEUTROPHILS # BLD: 53.9 % (ref 37–92)
NEUTROPHILS ABS,2156: 1.8 K/UL (ref 2–7.8)
NITRITE UR QL STRIP.AUTO: NEGATIVE
PH UR STRIP: 5 [PH] (ref 5–7)
PLATELET # BLD AUTO: 233 K/UL (ref 140–440)
PMV BLD AUTO: 8.2 FL
POTASSIUM SERPL-SCNC: 4.5 MMOL/L (ref 3.6–5)
PROT SERPL-MCNC: 7.4 G/DL (ref 6.3–8.2)
PROT UR STRIP-MCNC: ABNORMAL MG/DL
RBC # BLD AUTO: 5.26 M/UL (ref 4.2–6.3)
RBC #/AREA URNS HPF: ABNORMAL #/HPF
SODIUM SERPL-SCNC: 143 MMOL/L (ref 137–145)
SP GR UR REFRACTOMETRY: 1.03 (ref 1–1.03)
SQUAMOUS EPITHELIAL CELLS: ABNORMAL
TRIGL SERPL-MCNC: 157 MG/DL (ref 0–200)
UROBILINOGEN UR QL STRIP.AUTO: NEGATIVE
VLDLC SERPL CALC-MCNC: 31 MG/DL
WBC # BLD AUTO: 3.4 K/UL (ref 4.1–10.9)
WBC URNS QL MICRO: ABNORMAL #/HPF

## 2019-02-28 LAB
EST. AVERAGE GLUCOSE BLD GHB EST-MCNC: 103 MG/DL
HBA1C MFR BLD: 5.2 % (ref 4.8–5.6)
VIT B12 SERPL-MCNC: 352 PG/ML (ref 232–1245)

## 2019-03-01 LAB
BACTERIA UR CULT: NORMAL
TSH SERPL DL<=0.05 MIU/L-ACNC: 2.18 UIU/ML (ref 0.34–5.6)

## 2019-03-02 LAB
25(OH)D3 SERPL-MCNC: 17 NG/ML (ref 30–96)
T4 FREE SERPL-MCNC: 0.99 NG/DL (ref 0.58–2.3)

## 2019-03-05 RX ORDER — ERGOCALCIFEROL 1.25 MG/1
50000 CAPSULE ORAL
Qty: 12 CAP | Refills: 0 | Status: SHIPPED | OUTPATIENT
Start: 2019-03-05 | End: 2020-08-13 | Stop reason: ALTCHOICE

## 2019-03-05 NOTE — PROGRESS NOTES
Lab results discussed with patient. Vitamin D 17. Start vitamin [id number] units weekly x 3 mos. Repeat level in 3 mos. WBC 3.4 Repeat in 3 weeks.  All Lab faxed to Dr Jin Richards her GI specialist.

## 2019-04-02 ENCOUNTER — OFFICE VISIT (OUTPATIENT)
Dept: INTERNAL MEDICINE CLINIC | Age: 44
End: 2019-04-02

## 2019-04-02 VITALS
WEIGHT: 193 LBS | BODY MASS INDEX: 34.2 KG/M2 | HEIGHT: 63 IN | OXYGEN SATURATION: 96 % | SYSTOLIC BLOOD PRESSURE: 114 MMHG | DIASTOLIC BLOOD PRESSURE: 79 MMHG | HEART RATE: 80 BPM | TEMPERATURE: 99 F

## 2019-04-02 DIAGNOSIS — R68.89 FLU-LIKE SYMPTOMS: Primary | ICD-10-CM

## 2019-04-02 DIAGNOSIS — J06.9 UPPER RESPIRATORY TRACT INFECTION, UNSPECIFIED TYPE: ICD-10-CM

## 2019-04-02 LAB
FLUAV RNA SPEC QL NAA+PROBE: NEGATIVE
FLUBV RNA SPEC QL NAA+PROBE: NEGATIVE

## 2019-04-02 RX ORDER — AZITHROMYCIN 250 MG/1
TABLET, FILM COATED ORAL
Qty: 6 TAB | Refills: 0 | Status: SHIPPED | OUTPATIENT
Start: 2019-04-02 | End: 2019-04-02 | Stop reason: SDUPTHER

## 2019-04-02 RX ORDER — AZITHROMYCIN 250 MG/1
TABLET, FILM COATED ORAL
Qty: 6 TAB | Refills: 0 | OUTPATIENT
Start: 2019-04-02 | End: 2019-12-09

## 2019-04-02 RX ORDER — NORETHINDRONE ACETATE AND ETHINYL ESTRADIOL AND FERROUS FUMARATE 1MG-20(21)
KIT ORAL
Refills: 0 | COMMUNITY
Start: 2019-03-06 | End: 2020-08-13 | Stop reason: ALTCHOICE

## 2019-04-02 NOTE — PROGRESS NOTES
Subjective:  Ms. Gayathri Leigh is a pleasant 37year old lady who comes in today with a one day history of body aches, nasal congestion, nonproductive cough and some fatigue. She has had a slight fever. She denies any shortness of breath, but does have wheezing that she attributes to her asthma. Denies any hemoptysis. Denies any nausea or vomiting. Past Medical History:   Diagnosis Date    Annual physical exam 10/10/2017    Asthma     Crohn disease ([facility] [address] 10/10/2017    Crohn's disease ([facility] [address]     Depression     Elevated LFTs 10/10/2017    Environmental allergies     Fatty liver     Foot pain 10/10/2017    Gastrointestinal disorder     Crohns    Migraine     Osteoporosis screening 10/10/2017    Panic attacks     Screening for diabetes mellitus 10/10/2017    Screening for hyperlipidemia 10/10/2017    Screening for hypothyroidism 10/10/2017    Unspecified vitamin D deficiency     Vitamin B 12 deficiency 10/10/2017    Vitamin B12 deficiency     Vitamin D deficiency 10/10/2017     Past Surgical History:   Procedure Laterality Date    ABDOMEN SURGERY PROC UNLISTED      colon resection    HX APPENDECTOMY      HX OTHER SURGICAL  1999    terminal ileum resected    HX OTHER SURGICAL  1999    bladder reconstruction due to fistula    HX UROLOGICAL      bladder       Current Outpatient Medications on File Prior to Visit   Medication Sig Dispense Refill    JUNEL FE 1/20, 28, 1 mg-20 mcg (21)/75 mg (7) tab TAKE 1 TABLET BY MOUTH EVERY DAY  0    ergocalciferol (ERGOCALCIFEROL) 50,000 unit capsule Take 1 Cap by mouth every seven (7) days. 12 Cap 0    multivitamin (ONE A DAY) tablet Take 1 Tab by mouth daily.  budesonide-formoterol (SYMBICORT) 160-4.5 mcg/actuation HFAA Take 2 Puffs by inhalation two (2) times a day. 1 Inhaler 6    montelukast (SINGULAIR) 10 mg tablet Take 1 Tab by mouth daily.  90 Tab 3    albuterol (PROVENTIL VENTOLIN) 2.5 mg /3 mL (0.083 %) nebulizer solution 3 mL by Nebulization route every four (4) hours as needed for Wheezing. 1 Package 3    albuterol (PROAIR HFA) 90 mcg/actuation inhaler Take 2 Puffs by inhalation every four (4) hours as needed for Wheezing. 1 Inhaler 3    fluticasone (FLONASE) 50 mcg/actuation nasal spray 2 Sprays by Both Nostrils route daily.  Nebulizer & Compressor machine Patient already have instructions how to use. 1 Each 0    ALPRAZolam (XANAX) 0.25 mg tablet Take 1 Tab by mouth two (2) times daily as needed for Anxiety. Max Daily Amount: 0.5 mg. 60 Tab 0     No current facility-administered medications on file prior to visit. Allergies   Allergen Reactions    Latex Shortness of Breath and Rash    Latex Rash    Tape [Adhesive] Rash     Physical Examination:  GENERAL:  Pleasant young lady in no acute distress. She is alert and oriented. VITALS:  BP: 114/79. P: 80.  T: 99.  O2 sat: 96. HEENT:  Normocephalic, atraumatic. TMs normal.  Mouth mucosa pink. Tongue midline. Pharynx minimally injected. NECK:  Supple without adenopathy. CHEST:  Lungs clear to auscultation, no rales or wheezes. CV:  Heart regular rhythm without murmur. EXTREMITIES:  No edema or calf tenderness. Distal pulses were present. Studies:  Rapid influenza was negative for both A and B. Impression:  1. URI, I suspect this could be viral in nature. Plan:  1. It was opted to treat her conservatively with Tylenol, one or two every four hours as needed for fever or aches. 2. Increase fluids and rest.  3. I have given her a prescription for a Z-Tao, only to use should her symptoms worsen over the next 48 hours. She is in agreement. Otherwise she will contact us if she has any concerns.

## 2019-04-02 NOTE — PROGRESS NOTES
Bob Jackson  Identified pt with two pt identifiers(name and ). Chief Complaint   Patient presents with    Cough    Fatigue    Fever    Generalized Body Aches    Nasal Congestion   Patient here with above complaints with sudden onset at 2:30 am today. 1. Have you been to the ER, urgent care clinic since your last visit? Hospitalized since your last visit? no    2. Have you seen or consulted any other health care providers outside of the [address] [place] [address] since your last visit? Include any pap smears or colon screening. no      Medication reconciliation up to date and corrected with patient at this time. Today's provider has been notified of reason for visit, vitals and flowsheets obtained on patients. Reviewed record in preparation for visit, huddled with provider and have obtained necessary documentation. Depression Risk Factor Screening:     3 most recent PHQ Screens 3/28/2018   PHQ Not Done Active Diagnosis of Depression or Bipolar Disorder   Little interest or pleasure in doing things -   Feeling down, depressed, irritable, or hopeless -   Total Score PHQ 2 -       Functional Ability and Level of Safety:     Activities of Daily Living  No flowsheet data found. Fall Risk  No flowsheet data found. Abuse Screen  No flowsheet data found.         Health Maintenance Due   Topic    Pneumococcal 0-64 years (1 of 1 - PPSV23)    DTaP/Tdap/Td series (1 - Tdap)    PAP AKA CERVICAL CYTOLOGY        Wt Readings from Last 3 Encounters:   19 193 lb (87.5 kg)   19 189 lb (85.7 kg)   10/25/18 203 lb (92.1 kg)     Temp Readings from Last 3 Encounters:   19 99 °F (37.2 °C) (Oral)   19 98.3 °F (36.8 °C) (Oral)   10/08/18 99.2 °F (37.3 °C) (Oral)     BP Readings from Last 3 Encounters:   19 114/79   19 127/88   10/25/18 133/82     Pulse Readings from Last 3 Encounters:   19 80   19 90   10/25/18 85     Vitals:    19 1346   BP: 114/79 Pulse: 80   Temp: 99 °F (37.2 °C)   TempSrc: Oral   SpO2: 96%   Weight: 193 lb (87.5 kg)   Height: 5' 3\" (1.6 m)         Patient Care Team   Patient Care Team:  James Burgos NP as PCP - General (Nurse Practitioner)  Riya Knott MD (Gastroenterology)

## 2019-04-02 NOTE — PATIENT INSTRUCTIONS

## 2019-05-22 DIAGNOSIS — E55.9 VITAMIN D DEFICIENCY: Primary | ICD-10-CM

## 2019-09-24 PROBLEM — Z13.1 SCREENING FOR DIABETES MELLITUS: Status: RESOLVED | Noted: 2017-10-10 | Resolved: 2019-09-24

## 2019-09-24 PROBLEM — Z13.820 OSTEOPOROSIS SCREENING: Status: RESOLVED | Noted: 2017-10-10 | Resolved: 2019-09-24

## 2019-09-24 PROBLEM — Z00.00 ANNUAL PHYSICAL EXAM: Status: RESOLVED | Noted: 2017-10-10 | Resolved: 2019-09-24

## 2019-12-04 ENCOUNTER — TELEPHONE (OUTPATIENT)
Dept: INTERNAL MEDICINE CLINIC | Age: 44
End: 2019-12-04

## 2019-12-04 ENCOUNTER — OFFICE VISIT (OUTPATIENT)
Dept: INTERNAL MEDICINE CLINIC | Age: 44
End: 2019-12-04

## 2019-12-04 VITALS
BODY MASS INDEX: 36.96 KG/M2 | HEART RATE: 88 BPM | WEIGHT: 208.6 LBS | TEMPERATURE: 98.6 F | HEIGHT: 63 IN | DIASTOLIC BLOOD PRESSURE: 76 MMHG | SYSTOLIC BLOOD PRESSURE: 110 MMHG | RESPIRATION RATE: 18 BRPM

## 2019-12-04 DIAGNOSIS — F41.0 PANIC ATTACKS: Primary | ICD-10-CM

## 2019-12-04 DIAGNOSIS — F41.9 ANXIETY: ICD-10-CM

## 2019-12-04 RX ORDER — ALPRAZOLAM 0.25 MG/1
0.25 TABLET ORAL
Qty: 50 TAB | Refills: 0 | Status: SHIPPED | OUTPATIENT
Start: 2019-12-04 | End: 2019-12-18 | Stop reason: SDUPTHER

## 2019-12-04 NOTE — TELEPHONE ENCOUNTER
Patient wanted to let ISREAL Torres know that Dr. Kurt Campbell does not take [facility] and wanted to know if she still wanted her to this doctor and pay out of pocket. Please advise.

## 2019-12-04 NOTE — LETTER
Name:Khushbu Goff OKVICENTE:[birth year] MR #:[id number] Provider Name:Kimberly Coyle NP  
*VVBN-467* BSMG-491 (5/16) Page 1 of 5 Initial [employer] CONTROLLED SUBSTANCE AGREEMENT I may be prescribed medications that are controlled substances as part  of my treatment plan for management of my medical condition(s). The goal of my treatment plan is to maintain and/or improve my health and wellbeing. Because controlled substances have an increased risk of abuse or harm, continual re-evaluation is needed determine if the goals of my treatment plan are being met for my safety and the safety of others. Judy Valentin  am entering into this Controlled Substance Agreement with my provider, Maddison Harper, ISREAL at [address] [place] [address] . I understand that successful treatment requires mutual trust and honesty between me and my provider. I understand that there are state and federal laws and regulations which apply to the medications that my provider may prescribe that must be followed. I understand there are risks and benefits ts of taking the medicines that my provider may prescribe. I understand and agree that following this Agreement is necessary in continuing my provider-patient relationship and success of my treatment plan. As a part of my treatment plan, I agree to the following: COMMUNICATION: 
 
1. I will communicate fully with my provider about my medical condition(s), including the effect on my daily life and how well my medications are helping. I will tell my provider all of the medications that I take for any reason, including medications I receive from another health care provider, and will notify my provider about all issues, problems or concerns, including any side effects, which may be related to my medications.  
 
I understand that this information allows my provider to adjust my treatment plan to help manage my medical condition. I understand that this information will become part of my permanent medical record. 2. I will notify my provider if I have a history of alcohol/drug misuse/addiction or if I have had treatment for alcohol/drug addiction in the past, or if I have a new problem with or concern about alcohol/drug use/addiction, because this increases the likelihood of high risk behaviors and may lead to serious medical conditions. 3. Females Only: I will notify my provider if I am or become pregnant, or if I intend to become pregnant, or if I intend to breastfeed. I understand that communication of these issues with my provider is important, due to possible effects my medication could have on an unborn fetus or breastfeeding child. Name:Khushbu [facility]ameed [facility] BEC:[birth year] MR #:[id number] Provider Name:Jenni Coyle NP  
*BELINDA-292* BSMG-491 (5/16) Page 2 of 5 Initial [employer] MISUSE OF MEDICATIONS / DRUGS: 
 
1. I agree to take all controlled substances as prescribed, and will not misuse or abuse any controlled substances prescribed by my provider. For my safety, I will not increase the amount of medicine I take without first talking with and getting permission from my provider. 2. If I have a medical emergency, another health care provider may prescribe me medication. If I seek emergency treatment, I will notify my provider within seventy-two (72) hours. 3. I understand that my provider may discuss my use and/or possible misuse/abuse of controlled substances and alcohol, as appropriate, with any health care provider involved in my care, pharmacist or legal authority. ILLEGAL DRUGS: 
 
1. I will not use illegal drugs of any kind, including but not limited to marijuana, heroin, cocaine, or any prescription drug which is not prescribed to me.  
 
DRUG DIVERSION / PRESCRIPTION FRAUD: 
 
 1. I will not share, sell, trade, give away, or otherwise misuse my prescriptions or medications. 2. I will not alter any prescriptions provided to me by my provider. SINGLE PROVIDER: 
 
1. I agree that all controlled substances that I take will be prescribed only by my provider (or his/her covering provider) under this Agreement. This agreement does not prevent me from seeking emergency medical treatment or receiving pain management related to a surgery. PROTECTING MEDICATIONS: 
 
1. I am responsible for keeping my prescriptions and medications in a safe and secure place including safeguarding them from loss or theft. I understand that lost, stolen or damaged/destroyed prescriptions or medications will not be replaced. Name:.Lulú Larsen NNA:[birth year] MR #:[id number] Provider Name:Helga Coyle NP  
*HACU-370* BSMG-491 (5/16) Page 3 of 5 Initial [employer] PRESCRIPTION RENEWALS/REFILLS: 
 
 
1. I authorize my provider and my pharmacy to cooperate fully with any local, state, or federal law enforcement agency in the investigation of any possible misuse, sale, or other diversion of my controlled substance prescriptions or medications. RISKS: 
 
 
1. I understand that if I do not adhere to this Agreement in any way, my provider may change my prescriptions, stop prescribing controlled substances or end our provider-patient relationship. 2. If my provider decides to stop prescribing medication, or decides to end our provider-patient relationship,my provider may require that I taper my medications slowly. If necessary, my provider may also provide a prescription for other medications to treat my withdrawal symptoms. UNDERSTANDING THIS AGREEMENT: 
 
I understand that my provider may adjust or stop my prescriptions for controlled substances based on my medical condition and my treatment plan. I understand that this Agreement does not guarantee that I will be prescribed medications or controlled substances. I understand that controlled substances may be just one part of my treatment plan. My initial on each page and my signature below shows that I have read each page of this Agreement, I have had an opportunity to ask questions, and all of my questions have been answered to my satisfaction by my provider. By signing below, I agree to comply with this Agreement, and I understand that if I do not follow the Agreements listed above, my provider may stop 
 
 
 
_________________________________________  Date/Time 12/4/2019 9:35 AM   
             (Patient Signature)

## 2019-12-04 NOTE — PROGRESS NOTES
Keyanna Good is a 40 y.o. female     Chief Complaint   Patient presents with    Anxiety     having a hard time focusing       Visit Vitals  /76 (BP 1 Location: Left arm, BP Patient Position: Sitting)   Pulse 88   Temp 98.6 °F (37 °C) (Oral)   Resp 18   Ht 5' 3\" (1.6 m)   Wt 208 lb 9.6 oz (94.6 kg)   LMP 11/13/2019 (Exact Date)   BMI 36.95 kg/m²       Health Maintenance Due   Topic Date Due    Pneumococcal 0-64 years (1 of 1 - PPSV23) 09/11/1981    DTaP/Tdap/Td series (1 - Tdap) 09/11/1986    PAP AKA CERVICAL CYTOLOGY  09/11/1996    Influenza Age 9 to Adult  08/01/2019       1. Have you been to the ER, urgent care clinic since your last visit? Hospitalized since your last visit? No    2. Have you seen or consulted any other health care providers outside of the [address] [place] [address] since your last visit? Include any pap smears or colon screening.  No

## 2019-12-04 NOTE — TELEPHONE ENCOUNTER
Verified two patient identifiers, name and , spoke to patient and per Richard I told her she needs to call her insurance company and find out where her insurance covered and give us a call back. Patient stated she is going to call them and then get back to us with the information.

## 2019-12-04 NOTE — PROGRESS NOTES
Subjective:  Ms. Kanwal Pond is a pleasant 40year old lady who comes in today after several month absence. Over the last three weeks she has developed increasing anxiety and panic attacks. In the past she was treated with Xanax, which she only took on a prn basis. She has been taking one Xanax a day, but does not think it has been helpful. She was afraid to increase the frequency of the medication. She denies being depressed. She denies any suicidal thoughts. She does not note that anything in particular has brought these panic attacks on. She is sleeping well at night. Her appetite is unchanged. Past Medical History:   Diagnosis Date    Annual physical exam 10/10/2017    Asthma     Crohn disease ([address] 10/10/2017    Crohn's disease ([address]     Depression     Elevated LFTs 10/10/2017    Environmental allergies     Fatty liver     Foot pain 10/10/2017    Gastrointestinal disorder     Crohns    Migraine     Osteoporosis screening 10/10/2017    Panic attacks     Screening for diabetes mellitus 10/10/2017    Screening for hyperlipidemia 10/10/2017    Screening for hypothyroidism 10/10/2017    Unspecified vitamin D deficiency     Vitamin B 12 deficiency 10/10/2017    Vitamin B12 deficiency     Vitamin D deficiency 10/10/2017     Past Surgical History:   Procedure Laterality Date    ABDOMEN SURGERY PROC UNLISTED      colon resection    HX APPENDECTOMY      HX OTHER SURGICAL  1999    terminal ileum resected    HX OTHER SURGICAL  1999    bladder reconstruction due to fistula    HX UROLOGICAL      bladder       Current Outpatient Medications on File Prior to Visit   Medication Sig Dispense Refill    budesonide-formoterol (SYMBICORT) 160-4.5 mcg/actuation HFAA Take 2 Puffs by inhalation two (2) times a day. 1 Inhaler 6    montelukast (SINGULAIR) 10 mg tablet Take 1 Tab by mouth daily.  90 Tab 3    albuterol (PROVENTIL VENTOLIN) 2.5 mg /3 mL (0.083 %) nebulizer solution 3 mL by Nebulization route every four (4) hours as needed for Wheezing. 1 Package 3    albuterol (PROAIR HFA) 90 mcg/actuation inhaler Take 2 Puffs by inhalation every four (4) hours as needed for Wheezing. 1 Inhaler 3    fluticasone (FLONASE) 50 mcg/actuation nasal spray 2 Sprays by Both Nostrils route daily.  Nebulizer & Compressor machine Patient already have instructions how to use. 1 Each 0    JUNEL FE 1/20, 28, 1 mg-20 mcg (21)/75 mg (7) tab TAKE 1 TABLET BY MOUTH EVERY DAY  0    azithromycin (ZITHROMAX) 250 mg tablet Take 2 tablets initially then one tablet daily until completed 6 Tab 0    ergocalciferol (ERGOCALCIFEROL) 50,000 unit capsule Take 1 Cap by mouth every seven (7) days. 12 Cap 0    multivitamin (ONE A DAY) tablet Take 1 Tab by mouth daily.  [DISCONTINUED] ALPRAZolam (XANAX) 0.25 mg tablet Take 1 Tab by mouth two (2) times daily as needed for Anxiety. Max Daily Amount: 0.5 mg. 60 Tab 0     No current facility-administered medications on file prior to visit. Allergies   Allergen Reactions    Latex Shortness of Breath and Rash    Latex Rash    Adhesive Tape-Silicones Anaphylaxis and Rash    Tape [Adhesive] Rash     Physical Examination:  GENERAL:  Pleasant lady in no acute distress. She is alert and oriented. She answers my questions appropriately. VITALS:  Blood pressure:  110/76. Pulse:  88.  Respirations:  18.  Temperature:  98.6. O2 sat:  96.  Weight:  208 lbs, up by 10 lbs since her last visit. HEENT:  Normocephalic, atraumatic. NECK:  Supple without adenopathy. CHEST:  Lungs clear to auscultation, no rales or wheezes. CV:  Heart regular rhythm without murmur. EXTREMITIES:  No edema or calf tenderness. Distal pulses were present. Impression:  1. Chronic anxiety. 2. Panic attacks. Plan:  1. It was opted to restart her on Alprazolam 0.25 mg three times a day. 2. I do want to see her back in two weeks.   3. I am referring her to Madhav West, psychiatric nurse practitioner, for further evaluation. She is agreeable. Addendum:  While she was in the office she did sign the controlled substance agreement. I did review it with her.

## 2019-12-04 NOTE — PATIENT INSTRUCTIONS
Panic Attacks: Care Instructions  Your Care Instructions    During a panic attack, you may have a feeling of intense fear or terror, trouble breathing, chest pain or tightness, heartbeat changes, dizziness, sweating, and shaking. A panic attack starts suddenly and usually lasts from 5 to 20 minutes but may last even longer. You have the most anxiety about 10 minutes after the attack starts. An attack can begin with a stressful event, or it can happen without a cause. Although panic attacks can cause scary symptoms, you can learn to manage them with self-care, counseling, and medicine. Follow-up care is a key part of your treatment and safety. Be sure to make and go to all appointments, and call your doctor if you are having problems. It's also a good idea to know your test results and keep a list of the medicines you take. How can you care for yourself at home? · Take your medicine exactly as directed. Call your doctor if you think you are having a problem with your medicine. · Go to your counseling sessions and follow-up appointments. · Recognize and accept your anxiety. Then, when you are in a situation that makes you anxious, say to yourself, \"This is not an emergency. I feel uncomfortable, but I am not in danger. I can keep going even if I feel anxious. \"  · Be kind to your body:  ? Relieve tension with exercise or a massage. ? Get enough rest.  ? Avoid alcohol, caffeine, nicotine, and illegal drugs. They can increase your anxiety level, cause sleep problems, or trigger a panic attack. ? Learn and do relaxation techniques. See below for more about these techniques. · Engage your mind. Get out and do something you enjoy. Go to a funny movie, or take a walk or hike. Plan your day. Having too much or too little to do can make you anxious. · Keep a record of your symptoms. Discuss your fears with a good friend or family member, or join a support group for people with similar problems.  Talking to others sometimes relieves stress. · Get involved in social groups, or volunteer to help others. Being alone sometimes makes things seem worse than they are. · Get at least 30 minutes of exercise on most days of the week to relieve stress. Walking is a good choice. You also may want to do other activities, such as running, swimming, cycling, or playing tennis or team sports. Relaxation techniques  Do relaxation exercises for 10 to 20 minutes a day. You can play soothing, relaxing music while you do them, if you wish. · Tell others in your house that you are going to do your relaxation exercises. Ask them not to disturb you. · Find a comfortable place, away from all distractions and noise. · Lie down on your back, or sit with your back straight. · Focus on your breathing. Make it slow and steady. · Breathe in through your nose. Breathe out through either your nose or mouth. · Breathe deeply, filling up the area between your navel and your rib cage. Breathe so that your belly goes up and down. · Do not hold your breath. · Breathe like this for 5 to 10 minutes. Notice the feeling of calmness throughout your whole body. As you continue to breathe slowly and deeply, relax by doing the following for another 5 to 10 minutes:  · Tighten and relax each muscle group in your body. You can begin at your toes and work your way up to your head. · Imagine your muscle groups relaxing and becoming heavy. · Empty your mind of all thoughts. · Let yourself relax more and more deeply. · Become aware of the state of calmness that surrounds you. · When your relaxation time is over, you can bring yourself back to alertness by moving your fingers and toes and then your hands and feet and then stretching and moving your entire body. Sometimes people fall asleep during relaxation, but they usually wake up shortly afterward.   · Always give yourself time to return to full alertness before you drive a car or do anything that might cause an accident if you are not fully alert. Never play a relaxation tape while driving a car. When should you call for help? Call 911 anytime you think you may need emergency care. For example, call if:    · You feel you cannot stop from hurting yourself or someone else.    Watch closely for changes in your health, and be sure to contact your doctor if:    · Your panic attacks get worse.     · You have new or different anxiety.     · You are not getting better as expected. Where can you learn more? Go to http://sanjay-davis.info/. Enter H601 in the search box to learn more about \"Panic Attacks: Care Instructions. \"  Current as of: May 28, 2019  Content Version: 12.2  © 9592-5908 [employer]. Care instructions adapted under license by [employer] (which disclaims liability or warranty for this information). If you have questions about a medical condition or this instruction, always ask your healthcare professional. [place] [address] any warranty or liability for your use of this information.

## 2019-12-09 ENCOUNTER — TELEPHONE (OUTPATIENT)
Dept: INTERNAL MEDICINE CLINIC | Age: 44
End: 2019-12-09

## 2019-12-09 ENCOUNTER — APPOINTMENT (OUTPATIENT)
Dept: CT IMAGING | Age: 44
End: 2019-12-09
Payer: COMMERCIAL

## 2019-12-09 ENCOUNTER — HOSPITAL ENCOUNTER (EMERGENCY)
Age: 44
Discharge: HOME | End: 2019-12-09
Attending: EMERGENCY MEDICINE
Payer: COMMERCIAL

## 2019-12-09 VITALS
DIASTOLIC BLOOD PRESSURE: 75 MMHG | OXYGEN SATURATION: 99 % | HEIGHT: 62 IN | HEART RATE: 74 BPM | BODY MASS INDEX: 38.28 KG/M2 | RESPIRATION RATE: 18 BRPM | TEMPERATURE: 98.3 F | WEIGHT: 208 LBS | SYSTOLIC BLOOD PRESSURE: 132 MMHG

## 2019-12-09 DIAGNOSIS — N20.0 KIDNEY STONE: Primary | ICD-10-CM

## 2019-12-09 DIAGNOSIS — R11.2 NON-INTRACTABLE VOMITING WITH NAUSEA, UNSPECIFIED VOMITING TYPE: ICD-10-CM

## 2019-12-09 DIAGNOSIS — R10.9 FLANK PAIN: ICD-10-CM

## 2019-12-09 LAB
ALBUMIN SERPL-MCNC: 3.6 G/DL (ref 3.5–5)
ALBUMIN/GLOB SERPL: 1.1 {RATIO} (ref 1.1–2.2)
ALP SERPL-CCNC: 69 U/L (ref 45–117)
ALT SERPL-CCNC: 68 U/L (ref 12–78)
ANION GAP SERPL CALC-SCNC: 7 MMOL/L (ref 5–15)
APPEARANCE UR: CLEAR
AST SERPL-CCNC: 48 U/L (ref 15–37)
BACTERIA URNS QL MICRO: ABNORMAL /HPF
BASOPHILS # BLD: 0 K/UL (ref 0–0.1)
BASOPHILS NFR BLD: 0 % (ref 0–1)
BILIRUB SERPL-MCNC: 0.5 MG/DL (ref 0.2–1)
BILIRUB UR QL: NEGATIVE
BUN SERPL-MCNC: 11 MG/DL (ref 6–20)
BUN/CREAT SERPL: 11 (ref 12–20)
CALCIUM SERPL-MCNC: 9 MG/DL (ref 8.5–10.1)
CHLORIDE SERPL-SCNC: 105 MMOL/L (ref 97–108)
CO2 SERPL-SCNC: 28 MMOL/L (ref 21–32)
COLOR UR: ABNORMAL
CREAT SERPL-MCNC: 1.02 MG/DL (ref 0.55–1.02)
DIFFERENTIAL METHOD BLD: NORMAL
EOSINOPHIL # BLD: 0.1 K/UL (ref 0–0.4)
EOSINOPHIL NFR BLD: 3 % (ref 0–7)
EPITH CASTS URNS QL MICRO: ABNORMAL /LPF
ERYTHROCYTE [DISTWIDTH] IN BLOOD BY AUTOMATED COUNT: 12.5 % (ref 11.5–14.5)
GLOBULIN SER CALC-MCNC: 3.4 G/DL (ref 2–4)
GLUCOSE SERPL-MCNC: 129 MG/DL (ref 65–100)
GLUCOSE UR STRIP.AUTO-MCNC: NEGATIVE MG/DL
HCT VFR BLD AUTO: 41.8 % (ref 35–47)
HGB BLD-MCNC: 13.6 G/DL (ref 11.5–16)
HGB UR QL STRIP: NEGATIVE
IMM GRANULOCYTES # BLD AUTO: 0 K/UL (ref 0–0.04)
IMM GRANULOCYTES NFR BLD AUTO: 0 % (ref 0–0.5)
KETONES UR QL STRIP.AUTO: NEGATIVE MG/DL
LEUKOCYTE ESTERASE UR QL STRIP.AUTO: NEGATIVE
LYMPHOCYTES # BLD: 1.3 K/UL (ref 0.8–3.5)
LYMPHOCYTES NFR BLD: 29 % (ref 12–49)
MCH RBC QN AUTO: 28.2 PG (ref 26–34)
MCHC RBC AUTO-ENTMCNC: 32.5 G/DL (ref 30–36.5)
MCV RBC AUTO: 86.5 FL (ref 80–99)
MONOCYTES # BLD: 0.4 K/UL (ref 0–1)
MONOCYTES NFR BLD: 9 % (ref 5–13)
NEUTS SEG # BLD: 2.7 K/UL (ref 1.8–8)
NEUTS SEG NFR BLD: 59 % (ref 32–75)
NITRITE UR QL STRIP.AUTO: NEGATIVE
NRBC # BLD: 0 K/UL (ref 0–0.01)
NRBC BLD-RTO: 0 PER 100 WBC
PH UR STRIP: 5.5 [PH] (ref 5–8)
PLATELET # BLD AUTO: 191 K/UL (ref 150–400)
PMV BLD AUTO: 9.9 FL (ref 8.9–12.9)
POTASSIUM SERPL-SCNC: 3.6 MMOL/L (ref 3.5–5.1)
PROT SERPL-MCNC: 7 G/DL (ref 6.4–8.2)
PROT UR STRIP-MCNC: NEGATIVE MG/DL
RBC # BLD AUTO: 4.83 M/UL (ref 3.8–5.2)
RBC #/AREA URNS HPF: ABNORMAL /HPF (ref 0–5)
SODIUM SERPL-SCNC: 140 MMOL/L (ref 136–145)
SP GR UR REFRACTOMETRY: 1.03 (ref 1–1.03)
UA: UC IF INDICATED,UAUC: ABNORMAL
UROBILINOGEN UR QL STRIP.AUTO: 0.2 EU/DL (ref 0.2–1)
WBC # BLD AUTO: 4.5 K/UL (ref 3.6–11)
WBC URNS QL MICRO: ABNORMAL /HPF (ref 0–4)

## 2019-12-09 PROCEDURE — 96374 THER/PROPH/DIAG INJ IV PUSH: CPT

## 2019-12-09 PROCEDURE — 36415 COLL VENOUS BLD VENIPUNCTURE: CPT

## 2019-12-09 PROCEDURE — 80053 COMPREHEN METABOLIC PANEL: CPT

## 2019-12-09 PROCEDURE — 85025 COMPLETE CBC W/AUTO DIFF WBC: CPT

## 2019-12-09 PROCEDURE — 96375 TX/PRO/DX INJ NEW DRUG ADDON: CPT

## 2019-12-09 PROCEDURE — 96376 TX/PRO/DX INJ SAME DRUG ADON: CPT

## 2019-12-09 PROCEDURE — 74011250636 HC RX REV CODE- 250/636: Performed by: PHYSICIAN ASSISTANT

## 2019-12-09 PROCEDURE — 99284 EMERGENCY DEPT VISIT MOD MDM: CPT

## 2019-12-09 PROCEDURE — 81001 URINALYSIS AUTO W/SCOPE: CPT

## 2019-12-09 PROCEDURE — 87086 URINE CULTURE/COLONY COUNT: CPT

## 2019-12-09 PROCEDURE — 74011250637 HC RX REV CODE- 250/637: Performed by: PHYSICIAN ASSISTANT

## 2019-12-09 PROCEDURE — 74011250636 HC RX REV CODE- 250/636

## 2019-12-09 PROCEDURE — 74176 CT ABD & PELVIS W/O CONTRAST: CPT

## 2019-12-09 RX ORDER — MORPHINE SULFATE 4 MG/ML
INJECTION INTRAVENOUS
Status: COMPLETED
Start: 2019-12-09 | End: 2019-12-09

## 2019-12-09 RX ORDER — ONDANSETRON 2 MG/ML
4 INJECTION INTRAMUSCULAR; INTRAVENOUS
Status: COMPLETED | OUTPATIENT
Start: 2019-12-09 | End: 2019-12-09

## 2019-12-09 RX ORDER — HYDROMORPHONE HYDROCHLORIDE 1 MG/ML
1 INJECTION, SOLUTION INTRAMUSCULAR; INTRAVENOUS; SUBCUTANEOUS
Status: COMPLETED | OUTPATIENT
Start: 2019-12-09 | End: 2019-12-09

## 2019-12-09 RX ORDER — ONDANSETRON 4 MG/1
4 TABLET, ORALLY DISINTEGRATING ORAL
Qty: 10 TAB | Refills: 0 | Status: SHIPPED | OUTPATIENT
Start: 2019-12-09 | End: 2019-12-09

## 2019-12-09 RX ORDER — ONDANSETRON 2 MG/ML
INJECTION INTRAMUSCULAR; INTRAVENOUS
Status: DISCONTINUED
Start: 2019-12-09 | End: 2019-12-09 | Stop reason: HOSPADM

## 2019-12-09 RX ORDER — MORPHINE SULFATE 2 MG/ML
4 INJECTION, SOLUTION INTRAMUSCULAR; INTRAVENOUS
Status: DISCONTINUED | OUTPATIENT
Start: 2019-12-09 | End: 2019-12-09 | Stop reason: HOSPADM

## 2019-12-09 RX ORDER — TAMSULOSIN HYDROCHLORIDE 0.4 MG/1
0.4 CAPSULE ORAL
Status: COMPLETED | OUTPATIENT
Start: 2019-12-09 | End: 2019-12-09

## 2019-12-09 RX ORDER — TAMSULOSIN HYDROCHLORIDE 0.4 MG/1
0.4 CAPSULE ORAL DAILY
Qty: 7 CAP | Refills: 0 | Status: SHIPPED | OUTPATIENT
Start: 2019-12-09 | End: 2019-12-16

## 2019-12-09 RX ORDER — ONDANSETRON 4 MG/1
4 TABLET, ORALLY DISINTEGRATING ORAL
Qty: 10 TAB | Refills: 0 | Status: SHIPPED | OUTPATIENT
Start: 2019-12-09 | End: 2020-08-13 | Stop reason: ALTCHOICE

## 2019-12-09 RX ORDER — SODIUM CHLORIDE 0.9 % (FLUSH) 0.9 %
5-40 SYRINGE (ML) INJECTION EVERY 8 HOURS
Status: DISCONTINUED | OUTPATIENT
Start: 2019-12-09 | End: 2019-12-09 | Stop reason: HOSPADM

## 2019-12-09 RX ORDER — OXYCODONE HYDROCHLORIDE 10 MG/1
10 TABLET ORAL
Qty: 12 TAB | Refills: 0 | Status: SHIPPED | OUTPATIENT
Start: 2019-12-09 | End: 2019-12-09

## 2019-12-09 RX ORDER — OXYCODONE HYDROCHLORIDE 10 MG/1
10 TABLET ORAL
Qty: 12 TAB | Refills: 0 | Status: SHIPPED | OUTPATIENT
Start: 2019-12-09 | End: 2019-12-14

## 2019-12-09 RX ORDER — SODIUM CHLORIDE 0.9 % (FLUSH) 0.9 %
5-40 SYRINGE (ML) INJECTION AS NEEDED
Status: DISCONTINUED | OUTPATIENT
Start: 2019-12-09 | End: 2019-12-09 | Stop reason: HOSPADM

## 2019-12-09 RX ORDER — TAMSULOSIN HYDROCHLORIDE 0.4 MG/1
0.4 CAPSULE ORAL DAILY
Qty: 7 CAP | Refills: 0 | Status: SHIPPED | OUTPATIENT
Start: 2019-12-09 | End: 2019-12-09

## 2019-12-09 RX ORDER — KETOROLAC TROMETHAMINE 30 MG/ML
30 INJECTION, SOLUTION INTRAMUSCULAR; INTRAVENOUS
Status: COMPLETED | OUTPATIENT
Start: 2019-12-09 | End: 2019-12-09

## 2019-12-09 RX ADMIN — MORPHINE SULFATE 4 MG: 4 INJECTION, SOLUTION INTRAMUSCULAR; INTRAVENOUS at 16:13

## 2019-12-09 RX ADMIN — ONDANSETRON 4 MG: 2 INJECTION INTRAMUSCULAR; INTRAVENOUS at 15:28

## 2019-12-09 RX ADMIN — HYDROMORPHONE HYDROCHLORIDE 1 MG: 1 INJECTION, SOLUTION INTRAMUSCULAR; INTRAVENOUS; SUBCUTANEOUS at 17:59

## 2019-12-09 RX ADMIN — KETOROLAC TROMETHAMINE 30 MG: 30 INJECTION, SOLUTION INTRAMUSCULAR at 15:28

## 2019-12-09 RX ADMIN — SODIUM CHLORIDE 1000 ML: 900 INJECTION, SOLUTION INTRAVENOUS at 16:15

## 2019-12-09 RX ADMIN — ONDANSETRON 4 MG: 2 INJECTION INTRAMUSCULAR; INTRAVENOUS at 19:29

## 2019-12-09 RX ADMIN — TAMSULOSIN HYDROCHLORIDE 0.4 MG: 0.4 CAPSULE ORAL at 15:28

## 2019-12-09 NOTE — LETTER
[address] 
[facility] EMERGENCY DEPT 
[address] [address] 
[phone] Work/School Note Date: 12/9/2019 To Whom It May concern: 
 
Frank Go was seen and treated today in the emergency room. She may return to work in 2 to 3 days, as symptoms improve. Sincerely, Thomas Bone

## 2019-12-09 NOTE — ED NOTES
Patient presents to the ED ambulatory complaining of left side and flank pain. Patient reports the pain started at 1100 this morning. The pain is described as blunt and sharp. Patient acknowledges burning during urination. Patient also acknowledges a history of kidney stones.

## 2019-12-09 NOTE — DISCHARGE INSTRUCTIONS
Rest, push fluids, contact the Kidney Stone Hotline for follow up at [facility]([phone]). Return to the Emergency Dept for any worsening pain, nausea/vomiting, fever, decreased oral intake/urine output.

## 2019-12-10 NOTE — ED PROVIDER NOTES
EMERGENCY DEPARTMENT HISTORY AND PHYSICAL EXAM      Date: 12/9/2019  Patient Name: Basilio Magana    History of Presenting Illness     Chief Complaint   Patient presents with    Flank Pain     pt reports left flank pain beginning this morning, vomited earlier today       History Provided By: Patient and friend    HPI: Basilio Magana, 40 y.o. female presents ambulatory to the Emergency Dept with c/o abrupt onset L flank pain this am.   Pt c/o episode of vomiting earlier today. She reports h/o kidney stones as well as Crohns dz. She denied diarrhea, BRBPR/melena. She denied constipation/straining. She denied vaginal bleeding/discharge. She states she has not had a kidney stone in a very long time, but her symptoms are reminiscent of her stone in the past.  Pt denied abdominal pain. No rash/lesion. No chest pain or shortness of breath. No abdominal wall injury/strain. Appetite is mildly depressed \"because of the pain\". Chief Complaint: L flank pain  Duration: 1 Days  Timing:  Acute  Location: L flank  Quality: Stabbing and Sharp  Severity: Moderate to severe  Modifying Factors: pt with h/o kidney stones  Associated Symptoms: nausea, vomiting        There are no other complaints, changes, or physical findings at this time. PCP: Becky Burleson NP    Current Outpatient Medications   Medication Sig Dispense Refill    oxyCODONE IR (ROXICODONE) 10 mg tab immediate release tablet Take 1 Tab by mouth every six (6) hours as needed for Pain for up to 5 days. Max Daily Amount: 40 mg. 12 Tab 0    tamsulosin (FLOMAX) 0.4 mg capsule Take 1 Cap by mouth daily for 7 doses. 7 Cap 0    ondansetron (ZOFRAN ODT) 4 mg disintegrating tablet Take 1 Tab by mouth every eight (8) hours as needed for Nausea for up to 10 doses. 10 Tab 0    ALPRAZolam (XANAX) 0.25 mg tablet Take 1 Tab by mouth three (3) times daily as needed for Anxiety.  Max Daily Amount: 0.75 mg. 50 Tab 0    JUNEL FE 1/20, 28, 1 mg-20 mcg (21)/75 mg (7) tab TAKE 1 TABLET BY MOUTH EVERY DAY  0    ergocalciferol (ERGOCALCIFEROL) 50,000 unit capsule Take 1 Cap by mouth every seven (7) days. 12 Cap 0    multivitamin (ONE A DAY) tablet Take 1 Tab by mouth daily.  budesonide-formoterol (SYMBICORT) 160-4.5 mcg/actuation HFAA Take 2 Puffs by inhalation two (2) times a day. 1 Inhaler 6    montelukast (SINGULAIR) 10 mg tablet Take 1 Tab by mouth daily. 90 Tab 3    albuterol (PROVENTIL VENTOLIN) 2.5 mg /3 mL (0.083 %) nebulizer solution 3 mL by Nebulization route every four (4) hours as needed for Wheezing. 1 Package 3    albuterol (PROAIR HFA) 90 mcg/actuation inhaler Take 2 Puffs by inhalation every four (4) hours as needed for Wheezing. 1 Inhaler 3    fluticasone (FLONASE) 50 mcg/actuation nasal spray 2 Sprays by Both Nostrils route daily.  Nebulizer & Compressor machine Patient already have instructions how to use.  1 Each 0       Past History     Past Medical History:  Past Medical History:   Diagnosis Date    Annual physical exam 10/10/2017    Asthma     Crohn disease ([facility] [address] 10/10/2017    Crohn's disease ([facility] [address]     Depression     Elevated LFTs 10/10/2017    Environmental allergies     Fatty liver     Foot pain 10/10/2017    Gastrointestinal disorder     Crohns    Migraine     Osteoporosis screening 10/10/2017    Panic attacks     Screening for diabetes mellitus 10/10/2017    Screening for hyperlipidemia 10/10/2017    Screening for hypothyroidism 10/10/2017    Unspecified vitamin D deficiency     Vitamin B 12 deficiency 10/10/2017    Vitamin B12 deficiency     Vitamin D deficiency 10/10/2017       Past Surgical History:  Past Surgical History:   Procedure Laterality Date    ABDOMEN SURGERY PROC UNLISTED      colon resection    HX APPENDECTOMY      HX OTHER SURGICAL  1999    terminal ileum resected    HX OTHER SURGICAL  1999    bladder reconstruction due to fistula    HX UROLOGICAL      bladder       Family History:  Family History   Problem Relation Age of Onset    Diabetes Mother     Other Sister         crohns in half sister    Other Maternal Aunt         crohns    Diabetes Maternal Grandmother     Diabetes Paternal Grandmother     Diabetes Paternal Grandfather        Social History:  Social History     Tobacco Use    Smoking status: Former Smoker     Last attempt to quit: 2001     Years since quittin.8    Smokeless tobacco: Never Used    Tobacco comment: quit 10 years ago--less than 5 cigs/day   Substance Use Topics    Alcohol use: Yes     Frequency: Monthly or less    Drug use: No       Allergies: Allergies   Allergen Reactions    Latex Shortness of Breath and Rash    Latex Rash    Adhesive Tape-Silicones Anaphylaxis and Rash    Tape [Adhesive] Rash         Review of Systems   Review of Systems   Constitutional: Negative for chills and fever. HENT: Negative for congestion, rhinorrhea and sore throat. Respiratory: Negative for cough and shortness of breath. Cardiovascular: Negative for chest pain and palpitations. Gastrointestinal: Positive for nausea. Negative for abdominal pain, diarrhea and vomiting. Endocrine: Negative for polydipsia, polyphagia and polyuria. Genitourinary: Positive for difficulty urinating and flank pain. Negative for dysuria, hematuria, vaginal bleeding, vaginal discharge and vaginal pain. Musculoskeletal: Positive for back pain. Negative for neck pain and neck stiffness. Skin: Negative for rash and wound. Allergic/Immunologic: Positive for environmental allergies. Negative for food allergies. Neurological: Negative for dizziness, weakness and headaches. Hematological: Negative for adenopathy. Does not bruise/bleed easily. Psychiatric/Behavioral: Negative for agitation and confusion. Physical Exam   Physical Exam  Vitals signs and nursing note reviewed. Constitutional:       General: She is not in acute distress. Appearance: Normal appearance.  She is well-developed and normal weight. She is not diaphoretic. HENT:      Head: Normocephalic and atraumatic. Right Ear: Tympanic membrane normal.      Left Ear: Tympanic membrane normal.      Nose: Nose normal.      Mouth/Throat:      Mouth: Mucous membranes are moist.      Pharynx: No oropharyngeal exudate. Eyes:      General: No scleral icterus. Right eye: No discharge. Left eye: No discharge. Conjunctiva/sclera: Conjunctivae normal.   Neck:      Musculoskeletal: Normal range of motion and neck supple. Thyroid: No thyromegaly. Vascular: No JVD. Trachea: No tracheal deviation. Cardiovascular:      Rate and Rhythm: Normal rate and regular rhythm. Pulses: Normal pulses. Heart sounds: Normal heart sounds. Pulmonary:      Effort: Pulmonary effort is normal. No respiratory distress. Breath sounds: Normal breath sounds. No wheezing. Abdominal:      General: Abdomen is flat. Palpations: Abdomen is soft. There is no mass. Tenderness: There is tenderness. There is left CVA tenderness. Hernia: No hernia is present. Musculoskeletal: Normal range of motion. General: No tenderness. Lymphadenopathy:      Cervical: No cervical adenopathy. Skin:     General: Skin is warm and dry. Coloration: Skin is not pale. Findings: No erythema or rash. Neurological:      General: No focal deficit present. Mental Status: She is alert and oriented to person, place, and time. Motor: No abnormal muscle tone.       Coordination: Coordination normal.   Psychiatric:         Mood and Affect: Mood normal.         Behavior: Behavior normal.         Judgment: Judgment normal.         Diagnostic Study Results     Labs -     Recent Results (from the past 12 hour(s))   CBC WITH AUTOMATED DIFF    Collection Time: 12/09/19  2:40 PM   Result Value Ref Range    WBC 4.5 3.6 - 11.0 K/uL    RBC 4.83 3.80 - 5.20 M/uL    HGB 13.6 11.5 - 16.0 g/dL    HCT 41.8 35.0 - 47.0 %    MCV 86.5 80.0 - 99.0 FL    MCH 28.2 26.0 - 34.0 PG    MCHC 32.5 30.0 - 36.5 g/dL    RDW 12.5 11.5 - 14.5 %    PLATELET 334 418 - 617 K/uL    MPV 9.9 8.9 - 12.9 FL    NRBC 0.0 0  WBC    ABSOLUTE NRBC 0.00 0.00 - 0.01 K/uL    NEUTROPHILS 59 32 - 75 %    LYMPHOCYTES 29 12 - 49 %    MONOCYTES 9 5 - 13 %    EOSINOPHILS 3 0 - 7 %    BASOPHILS 0 0 - 1 %    IMMATURE GRANULOCYTES 0 0.0 - 0.5 %    ABS. NEUTROPHILS 2.7 1.8 - 8.0 K/UL    ABS. LYMPHOCYTES 1.3 0.8 - 3.5 K/UL    ABS. MONOCYTES 0.4 0.0 - 1.0 K/UL    ABS. EOSINOPHILS 0.1 0.0 - 0.4 K/UL    ABS. BASOPHILS 0.0 0.0 - 0.1 K/UL    ABS. IMM. GRANS. 0.0 0.00 - 0.04 K/UL    DF AUTOMATED     METABOLIC PANEL, COMPREHENSIVE    Collection Time: 12/09/19  2:40 PM   Result Value Ref Range    Sodium 140 136 - 145 mmol/L    Potassium 3.6 3.5 - 5.1 mmol/L    Chloride 105 97 - 108 mmol/L    CO2 28 21 - 32 mmol/L    Anion gap 7 5 - 15 mmol/L    Glucose 129 (H) 65 - 100 mg/dL    BUN 11 6 - 20 MG/DL    Creatinine 1.02 0.55 - 1.02 MG/DL    BUN/Creatinine ratio 11 (L) 12 - 20      GFR est AA >60 >60 ml/min/1.73m2    GFR est non-AA 59 (L) >60 ml/min/1.73m2    Calcium 9.0 8.5 - 10.1 MG/DL    Bilirubin, total 0.5 0.2 - 1.0 MG/DL    ALT (SGPT) 68 12 - 78 U/L    AST (SGOT) 48 (H) 15 - 37 U/L    Alk.  phosphatase 69 45 - 117 U/L    Protein, total 7.0 6.4 - 8.2 g/dL    Albumin 3.6 3.5 - 5.0 g/dL    Globulin 3.4 2.0 - 4.0 g/dL    A-G Ratio 1.1 1.1 - 2.2     URINALYSIS W/ REFLEX CULTURE    Collection Time: 12/09/19  2:49 PM   Result Value Ref Range    Color YELLOW/STRAW      Appearance CLEAR CLEAR      Specific gravity 1.027 1.003 - 1.030      pH (UA) 5.5 5.0 - 8.0      Protein NEGATIVE  NEG mg/dL    Glucose NEGATIVE  NEG mg/dL    Ketone NEGATIVE  NEG mg/dL    Bilirubin NEGATIVE  NEG      Blood NEGATIVE  NEG      Urobilinogen 0.2 0.2 - 1.0 EU/dL    Nitrites NEGATIVE  NEG      Leukocyte Esterase NEGATIVE  NEG      WBC 0-4 0 - 4 /hpf    RBC 0-5 0 - 5 /hpf    Epithelial cells MODERATE (A) FEW /lpf    Bacteria 1+ (A) NEG /hpf    UA:UC IF INDICATED URINE CULTURE ORDERED (A) CNI         Radiologic Studies -   CT ABD PELV WO CONT   Final Result   IMPRESSION: Obstructing 4 mm calculus in left proximal ureter with mild-moderate   hydronephrosis. CT Results  (Last 48 hours)               12/09/19 1532  CT ABD PELV WO CONT Final result    Impression:  IMPRESSION: Obstructing 4 mm calculus in left proximal ureter with mild-moderate   hydronephrosis. Narrative:  INDICATION: Emergency department presentation with left flank pain beginning   11:00 this morning. COMPARISON: CT 4/12/2014       EXAM: Unenhanced contiguous axial images of the abdomen and pelvis with coronal   and sagittal reformations are obtained per usual protocol to evaluate for renal   or ureteral calculus. CT dose reduction was achieved through use of a   standardized protocol tailored for this examination and automatic exposure   control for dose modulation. The lack of oral and IV contrast material diminishes the capacity of CT to   evaluate the bowel and solid organs. FINDINGS:        There is a 3 mm nodule in the lateral segment of the right middle lobe which is   unchanged. Cardiac size and contour are normal.       There is hepatic steatosis again shown. No biliary duct dilation or hepatic mass   is noted. Gallbladder size and contour is normal. The spleen, pancreas and   adrenal glands appear within normal limits. Kidneys show no intrarenal calculus. There is mild-moderate left renal   pelvocaliectasis to the level of the proximal ureter just distal to the renal   pelvis at the level of transition there is an obstructing calculus measuring 4   mm. The ureter distal to this level appears normal.       There is no large peritoneal mass or adenopathy demonstrated. No bowel dilation   is shown. There is a distal suture line in the region of the cecum.  There is no   free intraperitoneal air or fluid. The urinary bladder is collapsed though   otherwise unremarkable in appearance. The reproductive organs are unremarkable. No destructive bone lesion is shown. Mild degenerative disc disease at L5-S1 is   demonstrated. Medical Decision Making   I am the first provider for this patient. I reviewed the vital signs, available nursing notes, past medical history, past surgical history, family history and social history. Vital Signs-Reviewed the patient's vital signs. Patient Vitals for the past 12 hrs:   Temp Pulse Resp BP SpO2   12/09/19 1906 98.3 °F (36.8 °C) 74 18 132/75 99 %   12/09/19 1758 97.9 °F (36.6 °C) 80 18 131/73 99 %   12/09/19 1611 97.5 °F (36.4 °C) 75 19 129/83 97 %   12/09/19 1418 98.2 °F (36.8 °C) 87 18 (!) 147/106 98 %           Records Reviewed: Nursing Notes, Old Medical Records, Previous Radiology Studies and Previous Laboratory Studies    Provider Notes (Medical Decision Making):   Kidney stone, pyelonephritis, UTI, mass    ED Course:   Initial assessment performed. The patients presenting problems have been discussed, and they are in agreement with the care plan formulated and outlined with them. I have encouraged them to ask questions as they arise throughout their visit. Case d/w Dr. Jabier Borges who evaluated the patient at bedside. DISCHARGE NOTE:  The care plan has been outline with the patient and/or family, who verbally conveyed understanding and agreement. Available results have been reviewed. Patient and/or family understand the follow up plan as outlined and discharge instructions. Should their condition deterioration at any time after discharge the patient agrees to return, follow up sooner than outlined or seek medical assistance at the closest Emergency Room as soon as possible. Questions have been answered.  Discharge instructions and educational information regarding the patient's diagnosis as well a list of reasons why the patient would want to seek immediate medical attention, should their condition change, were reviewed directly with the patient/family       PLAN:  1. Discharge Medication List as of 12/9/2019  7:23 PM      START taking these medications    Details   oxyCODONE IR (ROXICODONE) 10 mg tab immediate release tablet Take 1 Tab by mouth every six (6) hours as needed for Pain for up to 5 days. Max Daily Amount: 40 mg., Normal, Disp-12 Tab, R-0      tamsulosin (FLOMAX) 0.4 mg capsule Take 1 Cap by mouth daily for 7 doses. , Normal, Disp-7 Cap, R-0      ondansetron (ZOFRAN ODT) 4 mg disintegrating tablet Take 1 Tab by mouth every eight (8) hours as needed for Nausea for up to 10 doses. , Normal, Disp-10 Tab, R-0         CONTINUE these medications which have NOT CHANGED    Details   ALPRAZolam (XANAX) 0.25 mg tablet Take 1 Tab by mouth three (3) times daily as needed for Anxiety. Max Daily Amount: 0.75 mg., Normal, Disp-50 Tab, R-0      JUNEL FE 1/20, 28, 1 mg-20 mcg (21)/75 mg (7) tab TAKE 1 TABLET BY MOUTH EVERY DAY, Historical Med, R-0, MARYCHUY      ergocalciferol (ERGOCALCIFEROL) 50,000 unit capsule Take 1 Cap by mouth every seven (7) days. , Normal, Disp-12 Cap, R-0      multivitamin (ONE A DAY) tablet Take 1 Tab by mouth daily. , Historical Med      budesonide-formoterol (SYMBICORT) 160-4.5 mcg/actuation HFAA Take 2 Puffs by inhalation two (2) times a day., Normal, Disp-1 Inhaler, R-6      montelukast (SINGULAIR) 10 mg tablet Take 1 Tab by mouth daily. , Normal, Disp-90 Tab, R-3      albuterol (PROVENTIL VENTOLIN) 2.5 mg /3 mL (0.083 %) nebulizer solution 3 mL by Nebulization route every four (4) hours as needed for Wheezing., Normal, Disp-1 Package, R-3      albuterol (PROAIR HFA) 90 mcg/actuation inhaler Take 2 Puffs by inhalation every four (4) hours as needed for Wheezing., Normal, Disp-1 Inhaler, R-3      fluticasone (FLONASE) 50 mcg/actuation nasal spray 2 Sprays by Both Nostrils route daily. , Historical Med      Nebulizer & Compressor machine Patient already have instructions how to use., Normal, Disp-1 Each, R-0           2. Follow-up Information     Follow up With Specialties Details Why Contact Info    Zara Fernandez MD Urology   [id number] [facility] [address] [id number] [phone]      [facility] EMERGENCY DEPT Emergency Medicine  If symptoms worsen [address] [place] [address]  [address] [facility]  [phone]        Return to ED if worse     Diagnosis     Clinical Impression:   1. Kidney stone    2. Flank pain    3.  Non-intractable vomiting with nausea, unspecified vomiting type

## 2019-12-10 NOTE — ED NOTES
Pt discharged home with friend. Discharge instructions explained to pt. All questions answered. PIV removed. Escorted to exit via wheelchair.

## 2019-12-11 LAB
BACTERIA SPEC CULT: NORMAL
CC UR VC: NORMAL
SERVICE CMNT-IMP: NORMAL

## 2019-12-18 ENCOUNTER — OFFICE VISIT (OUTPATIENT)
Dept: INTERNAL MEDICINE CLINIC | Age: 44
End: 2019-12-18

## 2019-12-18 VITALS
HEART RATE: 78 BPM | SYSTOLIC BLOOD PRESSURE: 124 MMHG | RESPIRATION RATE: 18 BRPM | WEIGHT: 210.9 LBS | TEMPERATURE: 98.3 F | BODY MASS INDEX: 38.81 KG/M2 | HEIGHT: 62 IN | DIASTOLIC BLOOD PRESSURE: 66 MMHG | OXYGEN SATURATION: 97 %

## 2019-12-18 DIAGNOSIS — F32.A MILD DEPRESSION: Primary | ICD-10-CM

## 2019-12-18 DIAGNOSIS — F41.9 ANXIETY: ICD-10-CM

## 2019-12-18 DIAGNOSIS — F41.0 PANIC ATTACKS: ICD-10-CM

## 2019-12-18 RX ORDER — ALPRAZOLAM 0.25 MG/1
0.25 TABLET ORAL
Qty: 90 TAB | Refills: 3 | Status: SHIPPED | OUTPATIENT
Start: 2019-12-18 | End: 2021-03-12 | Stop reason: SDUPTHER

## 2019-12-18 NOTE — PATIENT INSTRUCTIONS

## 2019-12-24 NOTE — PROGRESS NOTES
Subjective:  Ms. Marta Cota is a pleasant 40year old lady who comes in today for follow up of her chronic anxiety and panic attacks. I did see her two weeks ago, at which time I restarted her on Alprazolam 0.5 mg three times a day. I have referred her to Francine Gary, psychiatric nurse practitioner, but she has not yet done this appointment. She tells me she is feeling better. The Xanax has been helpful. Past Medical History:   Diagnosis Date    Annual physical exam 10/10/2017    Asthma     Crohn disease ([facility] [address] 10/10/2017    Crohn's disease ([facility] [address]     Depression     Elevated LFTs 10/10/2017    Environmental allergies     Fatty liver     Foot pain 10/10/2017    Gastrointestinal disorder     Crohns    Migraine     Osteoporosis screening 10/10/2017    Panic attacks     Screening for diabetes mellitus 10/10/2017    Screening for hyperlipidemia 10/10/2017    Screening for hypothyroidism 10/10/2017    Unspecified vitamin D deficiency     Vitamin B 12 deficiency 10/10/2017    Vitamin B12 deficiency     Vitamin D deficiency 10/10/2017     Past Surgical History:   Procedure Laterality Date    ABDOMEN SURGERY PROC UNLISTED      colon resection    HX APPENDECTOMY      HX OTHER SURGICAL  1999    terminal ileum resected    HX OTHER SURGICAL  1999    bladder reconstruction due to fistula    HX UROLOGICAL      bladder       Current Outpatient Medications on File Prior to Visit   Medication Sig Dispense Refill    multivitamin (ONE A DAY) tablet Take 1 Tab by mouth daily.  budesonide-formoterol (SYMBICORT) 160-4.5 mcg/actuation HFAA Take 2 Puffs by inhalation two (2) times a day. 1 Inhaler 6    montelukast (SINGULAIR) 10 mg tablet Take 1 Tab by mouth daily. 90 Tab 3    albuterol (PROVENTIL VENTOLIN) 2.5 mg /3 mL (0.083 %) nebulizer solution 3 mL by Nebulization route every four (4) hours as needed for Wheezing.  1 Package 3    albuterol (PROAIR HFA) 90 mcg/actuation inhaler Take 2 Puffs by inhalation every four (4) hours as needed for Wheezing. 1 Inhaler 3    fluticasone (FLONASE) 50 mcg/actuation nasal spray 2 Sprays by Both Nostrils route daily.  ondansetron (ZOFRAN ODT) 4 mg disintegrating tablet Take 1 Tab by mouth every eight (8) hours as needed for Nausea for up to 10 doses. 10 Tab 0    JUNEL FE 1/20, 28, 1 mg-20 mcg (21)/75 mg (7) tab TAKE 1 TABLET BY MOUTH EVERY DAY  0    ergocalciferol (ERGOCALCIFEROL) 50,000 unit capsule Take 1 Cap by mouth every seven (7) days. 12 Cap 0    Nebulizer & Compressor machine Patient already have instructions how to use. 1 Each 0     No current facility-administered medications on file prior to visit. Allergies   Allergen Reactions    Latex Shortness of Breath and Rash    Latex Rash    Adhesive Tape-Silicones Anaphylaxis and Rash    Tape [Adhesive] Rash   Physical Examination:  GENERAL:  Pleasant lady in no acute distress. She is alert and oriented. VITALS:  Blood pressure:  124/66. Pulse:  78.  Respirations:  18.  Temperature:  98.3. O2 sat: 97. HEENT:  Normocephalic, atraumatic. TMs normal.  Mouth mucosa pink. Tongue midline. Pharynx normal.  NECK:  Supple without adenopathy. CHEST:  Lungs clear to auscultation, no rales or wheezes. CV:  Heart regular rhythm without murmur. EXTREMITIES:  No edema or calf tenderness. Distal pulses were present. Impression:  1. Chronic anxiety/depression. Plan:  1. She will continue with her Xanax for the time being, but I certainly encouraged her to go ahead and get into counseling. I believe this would be very helpful.

## 2020-08-13 ENCOUNTER — VIRTUAL VISIT (OUTPATIENT)
Dept: INTERNAL MEDICINE CLINIC | Age: 45
End: 2020-08-13
Payer: COMMERCIAL

## 2020-08-13 DIAGNOSIS — E53.8 VITAMIN B 12 DEFICIENCY: ICD-10-CM

## 2020-08-13 DIAGNOSIS — E55.9 VITAMIN D DEFICIENCY: ICD-10-CM

## 2020-08-13 DIAGNOSIS — F41.9 ANXIETY: Primary | ICD-10-CM

## 2020-08-13 DIAGNOSIS — E66.01 MORBID OBESITY (HCC): ICD-10-CM

## 2020-08-13 PROCEDURE — 99214 OFFICE O/P EST MOD 30 MIN: CPT | Performed by: INTERNAL MEDICINE

## 2020-08-13 RX ORDER — NORETHINDRONE ACETATE AND ETHINYL ESTRADIOL 1; .02 MG/1; MG/1
TABLET ORAL
COMMUNITY
Start: 2020-07-25

## 2020-08-13 RX ORDER — BUSPIRONE HYDROCHLORIDE 7.5 MG/1
7.5 TABLET ORAL 3 TIMES DAILY
Qty: 90 TAB | Refills: 5 | Status: SHIPPED | OUTPATIENT
Start: 2020-08-13 | End: 2021-03-12 | Stop reason: ALTCHOICE

## 2020-08-13 NOTE — PROGRESS NOTES
Reviewed record in preparation for visit and have obtained necessary documentation. Identified pt with two pt identifiers(name and ). Chief Complaint   Patient presents with   [facility]       Health Maintenance Due   Topic Date Due    Pneumococcal Vaccine (1 of  - PPSV23) 1981    DTaP/Tdap/Td  (1 - Tdap) 1996    Pap Test  1996    Flu Vaccine  2020       Ms. Oren Sofia has a reminder for a \"due or due soon\" health maintenance. I have asked that she discuss this further with her primary care provider for follow-up on this health maintenance. Coordination of Care Questionnaire:  :     1) Have you been to an emergency room, urgent care clinic since your last visit? no   Hospitalized since your last visit? no             2) Have you seen or consulted any other health care providers outside of [address] [place] [address] since your last visit? no  (Include any pap smears or colon screenings in this section.)    3) In the event something were to happen to you and you were unable to speak on your behalf, do you have an Advance Directive/ Living Will in place stating your wishes? NO    Do you have an Advance Directive on file? no    4) Are you interested in receiving information on Advance Directives? NO    Patient is accompanied by self I have received verbal consent from Persaud Kin to discuss any/all medical information while they are present in the room.
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
## 2021-03-04 ENCOUNTER — TELEPHONE (OUTPATIENT)
Dept: INTERNAL MEDICINE CLINIC | Age: 46
End: 2021-03-04

## 2021-03-04 ENCOUNTER — DOCUMENTATION ONLY (OUTPATIENT)
Dept: INTERNAL MEDICINE CLINIC | Age: 46
End: 2021-03-04

## 2021-03-04 NOTE — TELEPHONE ENCOUNTER
Pt states she wants an appointment in office for depression. I reviewed the schedule with her and informed her that there were none available for me to schedule in the office in March so far and that If she likes I can schedule a virtual appt. Pt refused virtual appointment stating it is too personal for depression. Pt requests to be squeezed in sooner.

## 2021-03-04 NOTE — PROGRESS NOTES
Patient called requesting an appointment with [facility]. I called and informed the patient that since she established with Dr. Yola Hagan would not accept her as a patient again. [facility] is no longer accepting new patients. Patient was upset stating she does not see any other doctor. I named Dr. Bill Prescott at [facility] and she stated \"fine thanks\" then hung up.

## 2021-03-05 NOTE — TELEPHONE ENCOUNTER
Marva Snow  You 16 hours ago (3:57 PM)     She told me she, \"I don't want to talk to you, just have them schedule me in, this is ridiculous in the middle of a pandemic people are depressed and need to see their doctors\" and she hung up

## 2021-03-12 ENCOUNTER — OFFICE VISIT (OUTPATIENT)
Dept: INTERNAL MEDICINE CLINIC | Age: 46
End: 2021-03-12
Payer: COMMERCIAL

## 2021-03-12 VITALS
SYSTOLIC BLOOD PRESSURE: 115 MMHG | BODY MASS INDEX: 38.28 KG/M2 | TEMPERATURE: 96.7 F | HEART RATE: 77 BPM | HEIGHT: 62 IN | DIASTOLIC BLOOD PRESSURE: 73 MMHG | OXYGEN SATURATION: 97 % | RESPIRATION RATE: 18 BRPM | WEIGHT: 208 LBS

## 2021-03-12 DIAGNOSIS — E55.9 VITAMIN D DEFICIENCY: ICD-10-CM

## 2021-03-12 DIAGNOSIS — E66.01 MORBID OBESITY (HCC): ICD-10-CM

## 2021-03-12 DIAGNOSIS — F33.1 MODERATE EPISODE OF RECURRENT MAJOR DEPRESSIVE DISORDER (HCC): ICD-10-CM

## 2021-03-12 DIAGNOSIS — F41.9 ANXIETY: ICD-10-CM

## 2021-03-12 DIAGNOSIS — E53.8 VITAMIN B 12 DEFICIENCY: Primary | ICD-10-CM

## 2021-03-12 PROCEDURE — 99214 OFFICE O/P EST MOD 30 MIN: CPT | Performed by: INTERNAL MEDICINE

## 2021-03-12 RX ORDER — ALPRAZOLAM 0.25 MG/1
0.25 TABLET ORAL
Qty: 90 TAB | Refills: 1 | Status: SHIPPED | OUTPATIENT
Start: 2021-03-12 | End: 2021-10-06

## 2021-03-12 RX ORDER — VENLAFAXINE HYDROCHLORIDE 37.5 MG/1
37.5 CAPSULE, EXTENDED RELEASE ORAL
Qty: 45 CAP | Refills: 1 | Status: SHIPPED | OUTPATIENT
Start: 2021-03-12 | End: 2021-03-25 | Stop reason: SINTOL

## 2021-03-12 NOTE — PROGRESS NOTES
Reviewed record in preparation for visit and have obtained necessary documentation. Identified pt with two pt identifiers(name and ). Chief Complaint   Patient presents with    Depression       Health Maintenance Due   Topic Date Due    Hepatitis C Test  Never done    Pneumococcal Vaccine (1 of 1 - PPSV23) Never done    COVID-19 Vaccine (1) Never done    DTaP/Tdap/Td  (1 - Tdap) Never done    Pap Test  Never done    Yearly Flu Vaccine (1) 2020       Ms. Finch has a reminder for a \"due or due soon\" health maintenance. I have asked that she discuss this further with her primary care provider for follow-up on this health maintenance. Coordination of Care Questionnaire:  :     1) Have you been to an emergency room, urgent care clinic since your last visit? no   Hospitalized since your last visit? no             2) Have you seen or consulted any other health care providers outside of [address] [place] [address] since your last visit? no  (Include any pap smears or colon screenings in this section.)    3) In the event something were to happen to you and you were unable to speak on your behalf, do you have an Advance Directive/ Living Will in place stating your wishes? NO    Do you have an Advance Directive on file? no    4) Are you interested in receiving information on Advance Directives? NO    Patient is accompanied by self I have received verbal consent from Geoffrey Phalen to discuss any/all medical information while they are present in the room.

## 2021-03-12 NOTE — PATIENT INSTRUCTIONS
Start effexor take one pill at bedtime after one week if tolerating well increase to two pills Take xanax as needed avoid daily use Make appointment with psychologist  
 
Labs in the 2-3 days
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
## 2021-03-19 ENCOUNTER — TELEPHONE (OUTPATIENT)
Dept: INTERNAL MEDICINE CLINIC | Age: 46
End: 2021-03-19

## 2021-03-19 RX ORDER — ONDANSETRON 4 MG/1
4 TABLET, ORALLY DISINTEGRATING ORAL
Qty: 20 TAB | Refills: 0 | Status: SHIPPED | OUTPATIENT
Start: 2021-03-19

## 2021-03-19 NOTE — TELEPHONE ENCOUNTER
Spoke with patient. Two pt identifiers confirmed. Patient states that she has been taking the effexor and her hot flashes are completely gone, however, patient states that she has been having extreme nausea every morning since she started taking it. Patient states that nausea is so bad that she can hardly get up in the morning, so bad that it makes her dry heave. Patient states that she is suppose to increase her dosage today and she is concerned about doing this with the level of nausea that she is having. Patient would like to know what she should do, is there something that Dr. Vandana Martins can prescribe for the nausea? Advised patient that I will check with Dr. Vandana Martins and will give her a call back as soon as she responds. Pt verbalized understanding of information discussed w/ no further questions at this time.

## 2021-03-19 NOTE — TELEPHONE ENCOUNTER
Pt called requesting a call back from Karlie Guevara (or nurse)  regarding a medication question.     Re: isidoro      [phone]

## 2021-03-19 NOTE — TELEPHONE ENCOUNTER
MD Eliazar Tatum LPN   Caller: Unspecified (Today,  9:04 AM)             Should not increase dose remain at lower dose. Zofran sent in. If nausea does not go away in 2 weeks will need to stop med      Spoke with patient. Two pt identifiers confirmed. Patient advised of the above message and instructions per Dr. Candice Morris. Pt verbalized understanding of information discussed w/ no further questions at this time.

## 2021-03-24 ENCOUNTER — TELEPHONE (OUTPATIENT)
Dept: INTERNAL MEDICINE CLINIC | Age: 46
End: 2021-03-24

## 2021-03-24 NOTE — TELEPHONE ENCOUNTER
MD Kevin Sheppard LPN   Caller: Unspecified (Today, 12:31 PM)             Stop the medication / add virtual tomorrow PM so we can discuss plan double book last apt     Spoke with patient. Two pt identifiers confirmed. Patient advised per Dr. Natali Burch to stop her medication and appointment scheduled for 03/25/2021. Appointment accepted. Patient advised if anything changes or if unable to keep this appointment to call the office  Pt verbalized understanding of information discussed w/ no further questions at this time.      [facility] also [facility]

## 2021-03-24 NOTE — TELEPHONE ENCOUNTER
Spoke with patient. Two pt identifiers confirmed. Patient states that she is still having extreme nausea with the effexor. Patient states that she is also having diarrhea with it as well. Patient states she needs to know what she should do. Advised patient that I will send a message to Dr. Neena Elliott to see what her recommendations are and will give her a call back as soon as she responds. Pt verbalized understanding of information discussed w/ no further questions at this time.

## 2021-03-24 NOTE — TELEPHONE ENCOUNTER
----- Message from Livan Hayes sent at 3/24/2021 12:23 PM EDT -----  Regarding: Dr. Verna Montano first and last name: N/A      Reason for call: Speak with Frida Berman required yes/no and why: yes       Best contact number(s): [phone]      Details to clarify the request: Side effects of Medication \"Effexor\". Patient is concerned about this because she is not sure whether to taper off medication or not.       Message from [facility]

## 2021-03-25 ENCOUNTER — VIRTUAL VISIT (OUTPATIENT)
Dept: INTERNAL MEDICINE CLINIC | Age: 46
End: 2021-03-25
Payer: COMMERCIAL

## 2021-03-25 DIAGNOSIS — F33.1 MODERATE EPISODE OF RECURRENT MAJOR DEPRESSIVE DISORDER (HCC): Primary | ICD-10-CM

## 2021-03-25 PROCEDURE — 99213 OFFICE O/P EST LOW 20 MIN: CPT | Performed by: INTERNAL MEDICINE

## 2021-03-25 RX ORDER — DULOXETIN HYDROCHLORIDE 20 MG/1
20 CAPSULE, DELAYED RELEASE ORAL DAILY
Qty: 30 CAP | Refills: 1 | Status: SHIPPED | OUTPATIENT
Start: 2021-03-25 | End: 2021-04-02 | Stop reason: DRUGHIGH

## 2021-04-02 ENCOUNTER — PATIENT MESSAGE (OUTPATIENT)
Dept: INTERNAL MEDICINE CLINIC | Age: 46
End: 2021-04-02

## 2021-04-02 RX ORDER — DULOXETIN HYDROCHLORIDE 30 MG/1
30 CAPSULE, DELAYED RELEASE ORAL DAILY
Qty: 30 CAP | Refills: 2 | Status: SHIPPED | OUTPATIENT
Start: 2021-04-02 | End: 2021-04-22 | Stop reason: DRUGHIGH

## 2021-04-02 NOTE — TELEPHONE ENCOUNTER
Adalberto Orr LPN 3/6/7667 0:30 AM EDT      ----- Message -----  From: Estella Priest  Sent: 4/2/2021 9:00 AM EDT  To: [facility] Nurse Pool  Subject: Prescription Question     Good morning,     I wanted to provide an update on this new medication you switched me to. It is not upsetting my stomach like the previous medication. However, I am still unable to focus on the tasks at hand. my mind is either far too foggy to do so or it's racing with this I need to do and then the anxiety kicks in that I can't focus to get anything done. I feel like I am complaining, but I am desperate to find a solution. I have misplaced my lab worksheet and wondered if I could have you all email it to me so I can get that done asap. I will schedule it for first thing in the morning and will not EAT OR DRINK AFTER MIDNIGHT. So we will have a good idea if anything is going on.       Sincerely,   Estella Priest

## 2021-04-09 ENCOUNTER — VIRTUAL VISIT (OUTPATIENT)
Dept: INTERNAL MEDICINE CLINIC | Age: 46
End: 2021-04-09
Payer: COMMERCIAL

## 2021-04-09 DIAGNOSIS — F33.1 MODERATE EPISODE OF RECURRENT MAJOR DEPRESSIVE DISORDER (HCC): Primary | ICD-10-CM

## 2021-04-09 DIAGNOSIS — F41.9 ANXIETY: ICD-10-CM

## 2021-04-09 PROCEDURE — 90791 PSYCH DIAGNOSTIC EVALUATION: CPT | Performed by: SOCIAL WORKER
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
## 2021-04-22 ENCOUNTER — VIRTUAL VISIT (OUTPATIENT)
Dept: INTERNAL MEDICINE CLINIC | Age: 46
End: 2021-04-22
Payer: COMMERCIAL

## 2021-04-22 DIAGNOSIS — F41.9 ANXIETY: ICD-10-CM

## 2021-04-22 DIAGNOSIS — F33.1 MODERATE EPISODE OF RECURRENT MAJOR DEPRESSIVE DISORDER (HCC): Primary | ICD-10-CM

## 2021-04-22 PROCEDURE — 99213 OFFICE O/P EST LOW 20 MIN: CPT | Performed by: INTERNAL MEDICINE

## 2021-04-22 RX ORDER — DULOXETIN HYDROCHLORIDE 60 MG/1
60 CAPSULE, DELAYED RELEASE ORAL DAILY
Qty: 90 CAP | Refills: 1 | Status: SHIPPED | OUTPATIENT
Start: 2021-04-22

## 2021-04-22 NOTE — PROGRESS NOTES
Reviewed record in preparation for visit and have obtained necessary documentation. Identified pt with two pt identifiers(name and ). Chief Complaint   Patient presents with    Anxiety       Health Maintenance Due   Topic Date Due    Hepatitis C Test  Never done    Pneumococcal Vaccine (1 of 1 - PPSV23) Never done    COVID-19 Vaccine (1) Never done    DTaP/Tdap/Td  (1 - Tdap) Never done    Pap Test  Never done       Ms. Leilani Flood has a reminder for a \"due or due soon\" health maintenance. I have asked that she discuss this further with her primary care provider for follow-up on this health maintenance. Coordination of Care Questionnaire:  :     1) Have you been to an emergency room, urgent care clinic since your last visit? no   Hospitalized since your last visit? no             2) Have you seen or consulted any other health care providers outside of [address] [place] [address] since your last visit? no  (Include any pap smears or colon screenings in this section.)    3) In the event something were to happen to you and you were unable to speak on your behalf, do you have an Advance Directive/ Living Will in place stating your wishes? NO    Do you have an Advance Directive on file? no    4) Are you interested in receiving information on Advance Directives? NO    Patient is accompanied by self I have received verbal consent from Vickey Parikh to discuss any/all medical information while they are present in the room.

## 2021-04-23 ENCOUNTER — VIRTUAL VISIT (OUTPATIENT)
Dept: INTERNAL MEDICINE CLINIC | Age: 46
End: 2021-04-23
Payer: COMMERCIAL

## 2021-04-23 DIAGNOSIS — F41.9 ANXIETY: ICD-10-CM

## 2021-04-23 DIAGNOSIS — F33.1 MODERATE EPISODE OF RECURRENT MAJOR DEPRESSIVE DISORDER (HCC): Primary | ICD-10-CM

## 2021-04-23 PROCEDURE — 90834 PSYTX W PT 45 MINUTES: CPT | Performed by: SOCIAL WORKER

## 2021-04-23 NOTE — PROGRESS NOTES
CC: Anxiety      HPI:    She is a 39 y.o. female who presents for evaluation of anxiety. Patient notes significant improvement since starting Cymbalta 30 mg. She is able to concentrate more. She is able to maintain the house clean and organized. Depressive thoughts are present but better  Anxiety is better. She feels that she could benefit from increasing the dose        This is an established visit conducted via telemedicine with video. The patient has been instructed that this meets HIPAA criteria and acknowledges and agrees to this method of visitation. Pursuant to the emergency declaration under the [facility] waiver authority and the [employer] and [employer] Act, this Virtual Visit was conducted, with patient's consent, to reduce the patient's risk of exposure to COVID-19 and provide continuity of care for an established patient. Services were provided through a video synchronous discussion virtually to substitute for in-person clinic visit.        ROS:  Constitutional: negative for fevers, chills, anorexia and weight loss  10 systems reviewed and negative other than HPI    Past Medical History:   Diagnosis Date    Annual physical exam 10/10/2017    Asthma     Cro[facility] disease ([facility] 10/10/2017    [facility]'s disease ([facility]     Depression     Elevated LFTs 10/10/2017    Environmental allergies     Fatty liver     Foot pain 10/10/2017    Gastrointestinal disorder     Crohns    Migraine     Osteoporosis screening 10/10/2017    Panic attacks     Screening for diabetes mellitus 10/10/2017    Screening for hyperlipidemia 10/10/2017    Screening for hypothyroidism 10/10/2017    Unspecified vitamin D deficiency     Vitamin B 12 deficiency 10/10/2017    Vitamin B12 deficiency     Vitamin D deficiency 10/10/2017       Current Outpatient Medications on File Prior to Visit   Medication Sig Dispense Refill    ondansetron (ZOFRAN ODT) 4 mg disintegrating tablet Take 1 Tab by mouth every eight (8) hours as needed for Nausea or Vomiting. 20 Tab 0    ALPRAZolam (XANAX) 0.25 mg tablet Take 1 Tab by mouth daily as needed for Anxiety. 90 Tab 1    norethindrone-ethinyl estradiol (MICROGESTIN 1/20) 1-20 mg-mcg tablet       budesonide-formoterol (SYMBICORT) 160-4.5 mcg/actuation HFAA Take 2 Puffs by inhalation two (2) times a day. 1 Inhaler 6    montelukast (SINGULAIR) 10 mg tablet Take 1 Tab by mouth daily. 90 Tab 3    albuterol (PROVENTIL VENTOLIN) 2.5 mg /3 mL (0.083 %) nebulizer solution 3 mL by Nebulization route every four (4) hours as needed for Wheezing. 1 Package 3    albuterol (PROAIR HFA) 90 mcg/actuation inhaler Take 2 Puffs by inhalation every four (4) hours as needed for Wheezing. 1 Inhaler 3    fluticasone (FLONASE) 50 mcg/actuation nasal spray 2 Sprays by Both Nostrils route daily.  Nebulizer & Compressor machine Patient already have instructions how to use. 1 Each 0     No current facility-administered medications on file prior to visit.         Past Surgical History:   Procedure Laterality Date    HX APPENDECTOMY      HX OTHER SURGICAL  1999    terminal ileum resected    HX OTHER SURGICAL  1999    bladder reconstruction due to fistula    HX UROLOGICAL      bladder    RI ABDOMEN SURGERY PROC UNLISTED      colon resection       Family History   Problem Relation Age of Onset    Diabetes Mother     Other Sister         crohns in half sister    Other Maternal Aunt         crohns    Diabetes Maternal Grandmother     Diabetes Paternal Grandmother     Diabetes Paternal Grandfather      Reviewed and no changes     Social History     Socioeconomic History    Marital status: COMMON LAW     Spouse name: Not on file    Number of children: Not on file    Years of education: Not on file    Highest education level: Not on file   Occupational History    Not on file   Social Needs    Financial resource strain: Not on file    Food insecurity     Worry: Not on file     Inability: Not on file    Transportation needs     Medical: Not on file     Non-medical: Not on file   Tobacco Use    Smoking status: Former Smoker     Quit date: 2001     Years since quittin.2    Smokeless tobacco: Never Used    Tobacco comment: quit 10 years ago--less than 5 cigs/day   Substance and Sexual Activity    Alcohol use: Yes     Frequency: Monthly or less    Drug use: No    Sexual activity: Yes     Partners: Male   Lifestyle    Physical activity     Days per week: Not on file     Minutes per session: Not on file    Stress: Not on file   Relationships    Social connections     Talks on phone: Not on file     Gets together: Not on file     Attends Restorationist service: Not on file     Active member of club or organization: Not on file     Attends meetings of clubs or organizations: Not on file     Relationship status: Not on file    Intimate partner violence     Fear of current or ex partner: Not on file     Emotionally abused: Not on file     Physically abused: Not on file     Forced sexual activity: Not on file   Other Topics Concern    Not on file   Social History Narrative    ** Merged History Encounter **         Lives in [place] with her boyfriend and 15 yo daughter and 4 yo son from a previous marriage. Works as  for [employer] ([employer] services). Likes to go to the gym and wants to get back into running. There were no vitals taken for this visit. Physical Examination:   Gen: well appearing female  HEENT: normal conjunctiva, no audible congestion, patient does not see oral erythema, has MMM  Neck: patient does not feel enlarged or tender LAD or masses  Resp: normal respiratory effort, no audible wheezing.    CV: patient does not feel palpitations or heart irregularity  Abd: patient does not feel abdominal tenderness or mass, patient does not notice distension  Extrem: patient does not see swelling in ankles or joints.    Neuro: Alert and oriented, able to answer questions without difficulty, able to move all extremities and walk normally          Lab Results   Component Value Date/Time    WBC 4.5 12/09/2019 02:40 PM    HGB (POC) 14.4 03/28/2018 09:14 AM    HGB 13.6 12/09/2019 02:40 PM    HCT (POC) 43.9 03/28/2018 09:14 AM    HCT 41.8 12/09/2019 02:40 PM    PLATELET 533 89/37/7565 02:40 PM    MCV 86.5 12/09/2019 02:40 PM     Lab Results   Component Value Date/Time    Sodium 140 12/09/2019 02:40 PM    Potassium 3.6 12/09/2019 02:40 PM    Chloride 105 12/09/2019 02:40 PM    CO2 28 12/09/2019 02:40 PM    Anion gap 7 12/09/2019 02:40 PM    Glucose 129 (H) 12/09/2019 02:40 PM    BUN 11 12/09/2019 02:40 PM    Creatinine 1.02 12/09/2019 02:40 PM    BUN/Creatinine ratio 11 (L) 12/09/2019 02:40 PM    GFR est AA >60 12/09/2019 02:40 PM    GFR est non-AA 59 (L) 12/09/2019 02:40 PM    Calcium 9.0 12/09/2019 02:40 PM     Lab Results   Component Value Date/Time    Cholesterol, total 174 02/27/2019 08:34 AM    Cholesterol (POC) 153.0 03/28/2018 09:14 AM    HDL Cholesterol 53 02/27/2019 08:34 AM    HDL Cholesterol (POC) 52.0 03/28/2018 09:14 AM    LDL Cholesterol (POC) 48.4 03/28/2018 09:14 AM    LDL, calculated 90 02/27/2019 08:34 AM    VLDL 31 02/27/2019 08:34 AM    Triglyceride 157 02/27/2019 08:34 AM    Triglycerides (POC) 242.0 (A) 03/28/2018 09:14 AM    CHOL/HDL Ratio 3 02/27/2019 08:34 AM     Lab Results   Component Value Date/Time    TSH, 3rd generation 2.18 02/27/2019 08:34 AM     No results found for: PSA, Radha Rodrigues, [id number], [id number]  Lab Results   Component Value Date/Time    Hemoglobin A1c 5.2 02/27/2019 08:32 AM    Hemoglobin A1c (POC) 5.3 03/28/2018 09:14 AM     Lab Results   Component Value Date/Time    VITAMIN D, 25-HYDROXY 17 (L) 02/27/2019 08:34 AM       Lab Results   Component Value Date/Time    ALT (SGPT) 68 12/09/2019 02:40 PM    Alk. phosphatase 69 12/09/2019 02:40 PM    Bilirubin, total 0.5 12/09/2019 02:40 PM           Assessment/Plan:    1. Moderate episode of recurrent major depressive disorder (HCC)  Making progress increase Cymbalta to 60 mg  - DULoxetine (CYMBALTA) 60 mg capsule; Take 1 Cap by mouth daily. Dispense: 90 Cap; Refill: 1  Follow-up in 4 to 5 weeks            Darcy Raymundo MD    This is an established visit conducted via real time video and audio telemedicine. The patient has been instructed that this meets HIPAA criteria and acknowledges and agrees to this method of visitation.

## 2021-04-23 NOTE — PROGRESS NOTES
PSYCHOTHERAPY NOTE      Lisa Jaffe is a 39 y.o. female who presents with anxiety/depression. Client was seen for a virtual individual psychotherapy session that lasted for 50 minutes. Progress:  Client presents with slightly improved mood and affect. Reports that she recently saw her PCP and her Cymbalta was increased to 60mg. Client only recently had increase, but feels it to be beneficial to mood. Client is practicing the mindfulness and breathing exercises with benefit. Client struggles some at home trying to balance everything. Her  works long hours on 3rd shift and this prevents him from helping. She feels that it is affecting their relationship as they do not have much time together as a couple. We processed this in session today and explored ways to promote their communication. Client has few social supports. We addressed this in session and explored ways to promote more social support. Provided her with information on local groups and classes and client is amenable to looking into this. Focused on improving self care.      Mental Status exam:         Sensorium  oriented to time, place and person   Relations cooperative   Appearance:  casually dressed   Motor Behavior:  within normal limits   Speech:  normal pitch and normal volume   Thought Process: goal directed and logical   Thought Content free of delusions and free of hallucinations   Suicidal ideations none   Homicidal ideations none   Mood:  euthymic   Affect:  mood-congruent   Memory recent  adequate   Memory remote:  adequate   Concentration:  adequate   Abstraction:  abstract   Insight:  fair   Reliability fair   Judgment:  fair         DIAGNOSIS AND IMPRESSION:      Axis I: Major Depression, Rec  Axis II: Deferred  Axis III:   Past Medical History:   Diagnosis Date    Annual physical exam 10/10/2017    Asthma     Crohn disease ([facility] [address] 10/10/2017    Crohn's disease ([facility] [address]     Depression     Elevated LFTs 10/10/2017    Environmental allergies     Fatty liver     Foot pain 10/10/2017    Gastrointestinal disorder     Crohns    Migraine     Osteoporosis screening 10/10/2017    Panic attacks     Screening for diabetes mellitus 10/10/2017    Screening for hyperlipidemia 10/10/2017    Screening for hypothyroidism 10/10/2017    Unspecified vitamin D deficiency     Vitamin B 12 deficiency 10/10/2017    Vitamin B12 deficiency     Vitamin D deficiency 10/10/2017     Axis IV: Problems with primary support group, Problems related to social environment, Problems with access to health care services and Other psychosocial or environmental problems  Axis V:  61-70 mild symptoms    Clinical assignments: Follow up on finding class or group to participate     Interventions/plans: Follow up in 2 weeks      Pursuant to the emergency declaration under the [facility] waiver authority and the [employer] Act, this Virtual Visit was conducted, with patient and parent and/or guardian's consent, to reduce the patient's risk of exposure to COVID-19 and provide continuity of care for an established patient. Due to the state of emergency related to the coronavirus, the [place] [employer] and the [place] [employer] is allowing practitioners holding my licensure and/or certification status the ability to provide telehealth services without the usual requirements      This note will not be viewable in [employer] for the following reason(s).  Psychotherapy note

## 2021-05-21 ENCOUNTER — DOCUMENTATION ONLY (OUTPATIENT)
Dept: INTERNAL MEDICINE CLINIC | Age: 46
End: 2021-05-21

## 2021-05-21 NOTE — PROGRESS NOTES
Attempted to reach client for scheduled virtual visit. She did not respond to multiple attempts to reach her via [employer] platform, nor did she respond to staff's attempts at check-in. Will be considered a no show.

## 2021-10-05 DIAGNOSIS — F41.9 ANXIETY: ICD-10-CM

## 2021-10-06 RX ORDER — ALPRAZOLAM 0.25 MG/1
TABLET ORAL
Qty: 90 TABLET | Refills: 1 | Status: SHIPPED | OUTPATIENT
Start: 2021-10-06

## 2021-10-11 ENCOUNTER — HOSPITAL ENCOUNTER (OUTPATIENT)
Dept: MAMMOGRAPHY | Age: 46
Discharge: HOME | End: 2021-10-11
Attending: INTERNAL MEDICINE
Payer: COMMERCIAL

## 2021-10-11 DIAGNOSIS — Z12.31 VISIT FOR SCREENING MAMMOGRAM: ICD-10-CM

## 2021-10-11 PROCEDURE — 77063 BREAST TOMOSYNTHESIS BI: CPT

## 2021-10-12 NOTE — PROGRESS NOTES
IMPRESSION  BI-RADS 1: Negative. No mammographic evidence of malignancy.     RECOMMENDATIONS:  Next screening mammogram is recommended in one year.

## 2022-01-02 ENCOUNTER — HOSPITAL ENCOUNTER (EMERGENCY)
Age: 47
Discharge: HOME | End: 2022-01-02
Attending: EMERGENCY MEDICINE
Payer: COMMERCIAL

## 2022-01-02 VITALS
OXYGEN SATURATION: 97 % | SYSTOLIC BLOOD PRESSURE: 124 MMHG | HEART RATE: 85 BPM | BODY MASS INDEX: 38.04 KG/M2 | DIASTOLIC BLOOD PRESSURE: 84 MMHG | HEIGHT: 62 IN | TEMPERATURE: 98.3 F | RESPIRATION RATE: 18 BRPM

## 2022-01-02 DIAGNOSIS — Z20.822 PERSON UNDER INVESTIGATION FOR COVID-19: Primary | ICD-10-CM

## 2022-01-02 PROCEDURE — 99282 EMERGENCY DEPT VISIT SF MDM: CPT

## 2022-01-02 PROCEDURE — U0005 INFEC AGEN DETEC AMPLI PROBE: HCPCS

## 2022-01-02 PROCEDURE — 74011250637 HC RX REV CODE- 250/637: Performed by: EMERGENCY MEDICINE

## 2022-01-02 RX ORDER — LOPERAMIDE HYDROCHLORIDE 2 MG/1
2 CAPSULE ORAL
Status: COMPLETED | OUTPATIENT
Start: 2022-01-02 | End: 2022-01-02

## 2022-01-02 RX ORDER — ONDANSETRON 4 MG/1
4 TABLET, ORALLY DISINTEGRATING ORAL
Status: COMPLETED | OUTPATIENT
Start: 2022-01-02 | End: 2022-01-02

## 2022-01-02 RX ORDER — LOPERAMIDE HYDROCHLORIDE 2 MG/1
2 CAPSULE ORAL
Qty: 20 CAPSULE | Refills: 0 | Status: SHIPPED | OUTPATIENT
Start: 2022-01-02

## 2022-01-02 RX ORDER — NAPROXEN 500 MG/1
500 TABLET ORAL 2 TIMES DAILY WITH MEALS
Qty: 20 TABLET | Refills: 0 | Status: SHIPPED | OUTPATIENT
Start: 2022-01-02 | End: 2022-01-12

## 2022-01-02 RX ORDER — BENZONATATE 100 MG/1
100 CAPSULE ORAL
Qty: 20 CAPSULE | Refills: 0 | Status: SHIPPED | OUTPATIENT
Start: 2022-01-02 | End: 2022-01-09

## 2022-01-02 RX ORDER — NAPROXEN 250 MG/1
500 TABLET ORAL
Status: COMPLETED | OUTPATIENT
Start: 2022-01-02 | End: 2022-01-02

## 2022-01-02 RX ORDER — ONDANSETRON 4 MG/1
4 TABLET, FILM COATED ORAL
Qty: 20 TABLET | Refills: 0 | Status: SHIPPED | OUTPATIENT
Start: 2022-01-02

## 2022-01-02 RX ADMIN — NAPROXEN 500 MG: 250 TABLET ORAL at 04:56

## 2022-01-02 RX ADMIN — LOPERAMIDE HYDROCHLORIDE 2 MG: 2 CAPSULE ORAL at 04:56

## 2022-01-02 RX ADMIN — ONDANSETRON 4 MG: 4 TABLET, ORALLY DISINTEGRATING ORAL at 04:55

## 2022-01-02 NOTE — ED PROVIDER NOTES
EMERGENCY DEPARTMENT HISTORY AND PHYSICAL EXAM      Date: 1/2/2022  Patient Name: Danuta Cook  Patient Age and Sex: 55 y.o. female     History of Presenting Illness     Chief Complaint   Patient presents with    Abdominal Pain     Patient arrives with complaint of body aches, headache, nausea since yesterday. Patient has history of Crohns    Headache    Generalized Body Aches       History Provided By: Patient    HPI: Danuta Cook is a 41-year-old female presenting for headache, myalgias, cold symptoms. Patient states that yesterday started having body aches, headache, nasal congestion, cough, and fatigue. Patient states that she is also having some nausea and diarrhea. Has a history of Crohn's. Patient states that she is fully vaccinated for COVID-19. Unknown exposure. There are no other complaints, changes, or physical findings at this time. PCP: Yao Leyva MD    No current facility-administered medications on file prior to encounter. Current Outpatient Medications on File Prior to Encounter   Medication Sig Dispense Refill    ALPRAZolam (XANAX) 0.25 mg tablet TAKE ONE TABLET BY MOUTH ONE TIME DAILY AS NEEDED FOR ANXIETY 90 Tablet 1    DULoxetine (CYMBALTA) 60 mg capsule Take 1 Cap by mouth daily. 90 Cap 1    ondansetron (ZOFRAN ODT) 4 mg disintegrating tablet Take 1 Tab by mouth every eight (8) hours as needed for Nausea or Vomiting. 20 Tab 0    norethindrone-ethinyl estradiol (MICROGESTIN 1/20) 1-20 mg-mcg tablet       budesonide-formoterol (SYMBICORT) 160-4.5 mcg/actuation HFAA Take 2 Puffs by inhalation two (2) times a day. 1 Inhaler 6    montelukast (SINGULAIR) 10 mg tablet Take 1 Tab by mouth daily. 90 Tab 3    albuterol (PROVENTIL VENTOLIN) 2.5 mg /3 mL (0.083 %) nebulizer solution 3 mL by Nebulization route every four (4) hours as needed for Wheezing.  1 Package 3    albuterol (PROAIR HFA) 90 mcg/actuation inhaler Take 2 Puffs by inhalation every four (4) hours as needed for Wheezing. 1 Inhaler 3    fluticasone (FLONASE) 50 mcg/actuation nasal spray 2 Sprays by Both Nostrils route daily.  Nebulizer & Compressor machine Patient already have instructions how to use. 1 Each 0       Past History     Past Medical History:  Past Medical History:   Diagnosis Date    Annual physical exam 10/10/2017    Asthma     Crohn disease ([facility] [address] 10/10/2017    Crohn's disease ([facility] [address]     Depression     Elevated LFTs 10/10/2017    Environmental allergies     Fatty liver     Foot pain 10/10/2017    Gastrointestinal disorder     Crohns    Migraine     Osteoporosis screening 10/10/2017    Panic attacks     Screening for diabetes mellitus 10/10/2017    Screening for hyperlipidemia 10/10/2017    Screening for hypothyroidism 10/10/2017    Unspecified vitamin D deficiency     Vitamin B 12 deficiency 10/10/2017    Vitamin B12 deficiency     Vitamin D deficiency 10/10/2017       Past Surgical History:  Past Surgical History:   Procedure Laterality Date    HX APPENDECTOMY      HX OTHER SURGICAL      terminal ileum resected    HX OTHER SURGICAL      bladder reconstruction due to fistula    HX UROLOGICAL      bladder    MN ABDOMEN SURGERY PROC UNLISTED      colon resection       Family History:  Family History   Problem Relation Age of Onset    Diabetes Mother     Other Sister         crohns in half sister    Other Maternal Aunt         crohns    Diabetes Maternal Grandmother     Diabetes Paternal Grandmother     Diabetes Paternal Grandfather        Social History:  Social History     Tobacco Use    Smoking status: Former Smoker     Quit date: 2001     Years since quittin.9    Smokeless tobacco: Never Used    Tobacco comment: quit 10 years ago--less than 5 cigs/day   Substance Use Topics    Alcohol use: Yes    Drug use: No       Allergies:   Allergies   Allergen Reactions    Latex Rash and Shortness of Breath    Latex Shortness of Breath and Rash    Adhesive Tape-Silicones Anaphylaxis and Rash    Onion Other (comments)    Tape [Adhesive] Rash         Review of Systems   Review of Systems   Constitutional: Positive for chills, fatigue and fever. HENT: Positive for congestion and rhinorrhea. Respiratory: Positive for cough. Negative for shortness of breath. Cardiovascular: Negative for chest pain. Gastrointestinal: Positive for diarrhea and nausea. Negative for abdominal pain, constipation and vomiting. Genitourinary: Negative for dysuria, frequency and hematuria. Musculoskeletal: Positive for myalgias. Neurological: Positive for headaches. Negative for weakness and numbness. All other systems reviewed and are negative. Physical Exam   Physical Exam  Vitals and nursing note reviewed. Constitutional:       Appearance: She is well-developed. Comments: Appears tired   HENT:      Head: Normocephalic and atraumatic. Nose: Nose normal.      Mouth/Throat:      Mouth: Mucous membranes are moist.   Eyes:      Extraocular Movements: Extraocular movements intact. Conjunctiva/sclera: Conjunctivae normal.   Cardiovascular:      Rate and Rhythm: Normal rate and regular rhythm. Pulmonary:      Effort: Pulmonary effort is normal. No respiratory distress. Breath sounds: Normal breath sounds. Comments: Dry cough but lungs are completely clear. Speaking to me in full sentences  Abdominal:      General: There is no distension. Palpations: Abdomen is soft. Tenderness: There is no abdominal tenderness. Musculoskeletal:         General: Normal range of motion. Cervical back: Normal range of motion and neck supple. Skin:     General: Skin is warm and dry. Neurological:      General: No focal deficit present. Mental Status: She is alert and oriented to person, place, and time. Mental status is at baseline.    Psychiatric:         Mood and Affect: Mood normal.          Diagnostic Study Results     Labs -   No results found for this or any previous visit (from the past 12 hour(s)). Radiologic Studies -   No orders to display     CT Results  (Last 48 hours)    None        CXR Results  (Last 48 hours)    None            Medical Decision Making   I am the first provider for this patient. I reviewed the vital signs, available nursing notes, past medical history, past surgical history, family history and social history. Vital Signs-Reviewed the patient's vital signs. Patient Vitals for the past 12 hrs:   Temp Pulse Resp BP SpO2   01/02/22 0446 98.3 °F (36.8 °C) 85 18 124/84 97 %       Records Reviewed: Nursing Notes and Old Medical Records    Provider Notes (Medical Decision Making):   Patient presenting with URI symptoms. Most likely related to COVID-19 given this pandemic. Less likely flu, strep, pneumonia. Could also be other viral illness. We will do symptom management    ED Course:   Initial assessment performed. The patients presenting problems have been discussed, and they are in agreement with the care plan formulated and outlined with them. I have encouraged them to ask questions as they arise throughout their visit. Critical Care Time:   0    Disposition:  Discharge Note:  The patient has been re-evaluated and is ready for discharge. Reviewed available results with patient. Counseled patient on diagnosis and care plan. Patient has expressed understanding, and all questions have been answered. Patient agrees with plan and agrees to follow up as recommended, or to return to the ED if their symptoms worsen. Discharge instructions have been provided and explained to the patient, along with reasons to return to the ED. PLAN:  Current Discharge Medication List      START taking these medications    Details   ondansetron hcl (Zofran) 4 mg tablet Take 1 Tablet by mouth every eight (8) hours as needed for Nausea.   Qty: 20 Tablet, Refills: 0  Start date: 1/2/2022      benzonatate (Tessalon Perles) 100 mg capsule Take 1 Capsule by mouth three (3) times daily as needed for Cough for up to 7 days. Qty: 20 Capsule, Refills: 0  Start date: 1/2/2022, End date: 1/9/2022      loperamide (IMODIUM) 2 mg capsule Take 1 Capsule by mouth four (4) times daily as needed for Diarrhea. Qty: 20 Capsule, Refills: 0  Start date: 1/2/2022      naproxen (Naprosyn) 500 mg tablet Take 1 Tablet by mouth two (2) times daily (with meals) for 10 days. Qty: 20 Tablet, Refills: 0  Start date: 1/2/2022, End date: 1/12/2022           2. Follow-up Information     Follow up With Specialties Details Why Contact Info    Lexiea Brandon Mendoza MD Internal Medicine  As needed [address] [facility]  [phone]          3. Return to ED if worse     Diagnosis     Clinical Impression:   1. Person under investigation for COVID-19        Attestations:    Lb Napier M.D. Please note that this dictation was completed with [employer], the [employer] voice recognition software. Quite often unanticipated grammatical, syntax, homophones, and other interpretive errors are inadvertently transcribed by the computer software. Please disregard these errors. Please excuse any errors that have escaped final proofreading. Thank you.

## 2022-01-02 NOTE — Clinical Note
[address]  [facility] EMERGENCY DEPT  [address]  [address] [place] [id number] [phone]    Work/School Note    Date: 1/2/2022     To Whom It May concern:    Annabella Tatum was evaluated by the following provider(s):  Attending Provider: Sarah Shah MD.   Leonor [place] virus is suspected. Per the CDC guidelines we recommend home isolation until the following conditions are all met:    1. At least five days have passed since symptoms first appeared and/or had a close exposure,   2. After home isolation for five days, wearing a mask around others for the next five days,  3. At least 24 have passed since last fever without the use of fever-reducing medications and  4.  Symptoms (eg cough, shortness of breath) have improved      Sincerely,          Chelsey Mera MD

## 2022-01-04 LAB
SARS-COV-2, XPLCVT: DETECTED
SOURCE, COVRS: ABNORMAL

## 2022-03-18 PROBLEM — E66.01 SEVERE OBESITY (BMI 35.0-39.9) WITH COMORBIDITY (HCC): Status: ACTIVE | Noted: 2018-03-28

## 2022-03-18 PROBLEM — E55.9 VITAMIN D DEFICIENCY: Status: ACTIVE | Noted: 2017-10-10

## 2022-03-18 PROBLEM — J45.909 ASTHMA: Status: ACTIVE | Noted: 2017-10-10

## 2022-03-19 PROBLEM — M79.673 FOOT PAIN: Status: ACTIVE | Noted: 2017-10-10

## 2022-03-19 PROBLEM — R79.89 ELEVATED LFTS: Status: ACTIVE | Noted: 2017-10-10

## 2022-03-19 PROBLEM — F32.A MILD DEPRESSION: Status: ACTIVE | Noted: 2018-03-28

## 2022-03-19 PROBLEM — K50.90 CROHN DISEASE (HCC): Status: ACTIVE | Noted: 2017-10-10

## 2022-03-19 PROBLEM — E53.8 VITAMIN B 12 DEFICIENCY: Status: ACTIVE | Noted: 2017-10-10

## 2022-09-12 ENCOUNTER — TRANSCRIBE ORDER (OUTPATIENT)
Dept: SCHEDULING | Age: 47
End: 2022-09-12

## 2022-09-12 DIAGNOSIS — K50.919 CROHN'S DISEASE WITH COMPLICATION (HCC): Primary | ICD-10-CM

## 2022-09-13 ENCOUNTER — TRANSCRIBE ORDER (OUTPATIENT)
Dept: SCHEDULING | Age: 47
End: 2022-09-13

## 2022-09-13 DIAGNOSIS — N63.32 LUMP OF AXILLARY TAIL OF LEFT BREAST: Primary | ICD-10-CM

## 2022-09-20 ENCOUNTER — HOSPITAL ENCOUNTER (OUTPATIENT)
Dept: CT IMAGING | Age: 47
Discharge: HOME | End: 2022-09-20
Payer: COMMERCIAL

## 2022-09-20 ENCOUNTER — HOSPITAL ENCOUNTER (OUTPATIENT)
Dept: ULTRASOUND IMAGING | Age: 47
End: 2022-09-20
Payer: COMMERCIAL

## 2022-09-20 DIAGNOSIS — K50.919 CROHN'S DISEASE WITH COMPLICATION (HCC): ICD-10-CM

## 2022-09-20 PROCEDURE — 74177 CT ABD & PELVIS W/CONTRAST: CPT

## 2022-09-20 PROCEDURE — 74011000636 HC RX REV CODE- 636

## 2022-09-20 RX ORDER — BARIUM SULFATE 20 MG/ML
900 SUSPENSION ORAL
Status: DISCONTINUED | OUTPATIENT
Start: 2022-09-20 | End: 2022-09-20

## 2022-09-20 RX ADMIN — IOPAMIDOL 100 ML: 755 INJECTION, SOLUTION INTRAVENOUS at 16:18

## 2022-09-26 ENCOUNTER — TRANSCRIBE ORDER (OUTPATIENT)
Dept: SCHEDULING | Age: 47
End: 2022-09-26

## 2022-09-26 DIAGNOSIS — Z12.31 SCREENING MAMMOGRAM FOR BREAST CANCER: Primary | ICD-10-CM

## 2022-09-26 DIAGNOSIS — N63.32 LUMP OF AXILLARY TAIL OF LEFT BREAST: Primary | ICD-10-CM

## 2022-10-11 ENCOUNTER — TRANSCRIBE ORDER (OUTPATIENT)
Dept: SCHEDULING | Age: 47
End: 2022-10-11

## 2022-10-11 DIAGNOSIS — N63.32 LUMP OF AXILLARY TAIL OF LEFT BREAST: Primary | ICD-10-CM

## 2022-10-18 ENCOUNTER — HOSPITAL ENCOUNTER (OUTPATIENT)
Dept: MAMMOGRAPHY | Age: 47
Discharge: HOME | End: 2022-10-18
Payer: COMMERCIAL

## 2022-10-18 ENCOUNTER — HOSPITAL ENCOUNTER (OUTPATIENT)
Dept: ULTRASOUND IMAGING | Age: 47
Discharge: HOME | End: 2022-10-18
Payer: COMMERCIAL

## 2022-10-18 DIAGNOSIS — N63.32 LUMP OF AXILLARY TAIL OF LEFT BREAST: ICD-10-CM

## 2022-10-18 PROCEDURE — 77066 DX MAMMO INCL CAD BI: CPT

## 2022-10-18 PROCEDURE — 76882 US LMTD JT/FCL EVL NVASC XTR: CPT

## 2023-04-21 DIAGNOSIS — N63.32 LUMP OF AXILLARY TAIL OF LEFT BREAST: Primary | ICD-10-CM

## 2023-04-23 DIAGNOSIS — Z12.31 SCREENING MAMMOGRAM FOR BREAST CANCER: Primary | ICD-10-CM

## 2023-12-14 ENCOUNTER — APPOINTMENT (OUTPATIENT)
Facility: HOSPITAL | Age: 48
End: 2023-12-14
Payer: COMMERCIAL

## 2023-12-14 ENCOUNTER — HOSPITAL ENCOUNTER (INPATIENT)
Facility: HOSPITAL | Age: 48
LOS: 2 days | Discharge: LEFT BEFORE DISCHARGE | End: 2023-12-16
Attending: INTERNAL MEDICINE | Admitting: INTERNAL MEDICINE
Payer: COMMERCIAL

## 2023-12-14 DIAGNOSIS — E87.20 LACTIC ACIDOSIS: Primary | ICD-10-CM

## 2023-12-14 DIAGNOSIS — Z87.19 HISTORY OF CROHN'S DISEASE: ICD-10-CM

## 2023-12-14 DIAGNOSIS — A08.4 VIRAL GASTROENTERITIS: ICD-10-CM

## 2023-12-14 PROBLEM — A41.9 SEPSIS (HCC): Status: ACTIVE | Noted: 2023-12-14

## 2023-12-14 LAB
ALBUMIN SERPL-MCNC: 4 G/DL (ref 3.5–5)
ALBUMIN/GLOB SERPL: 1 (ref 1.1–2.2)
ALP SERPL-CCNC: 94 U/L (ref 45–117)
ALT SERPL-CCNC: 37 U/L (ref 12–78)
ANION GAP SERPL CALC-SCNC: 10 MMOL/L (ref 5–15)
APPEARANCE UR: CLEAR
AST SERPL-CCNC: 25 U/L (ref 15–37)
BASOPHILS # BLD: 0.1 K/UL (ref 0–0.1)
BASOPHILS NFR BLD: 1 % (ref 0–1)
BILIRUB SERPL-MCNC: 0.9 MG/DL (ref 0.2–1)
BILIRUB UR QL: NEGATIVE
BUN SERPL-MCNC: 17 MG/DL (ref 6–20)
BUN/CREAT SERPL: 20 (ref 12–20)
CALCIUM SERPL-MCNC: 9.3 MG/DL (ref 8.5–10.1)
CHLORIDE SERPL-SCNC: 110 MMOL/L (ref 97–108)
CO2 SERPL-SCNC: 22 MMOL/L (ref 21–32)
COLOR UR: NORMAL
CREAT SERPL-MCNC: 0.87 MG/DL (ref 0.55–1.02)
D DIMER PPP FEU-MCNC: 1.19 MG/L FEU (ref 0–0.65)
DIFFERENTIAL METHOD BLD: ABNORMAL
EOSINOPHIL # BLD: 0.1 K/UL (ref 0–0.4)
EOSINOPHIL NFR BLD: 1 % (ref 0–7)
ERYTHROCYTE [DISTWIDTH] IN BLOOD BY AUTOMATED COUNT: 12.3 % (ref 11.5–14.5)
FLUAV AG NPH QL IA: NEGATIVE
FLUBV AG NOSE QL IA: NEGATIVE
GLOBULIN SER CALC-MCNC: 4 G/DL (ref 2–4)
GLUCOSE SERPL-MCNC: 143 MG/DL (ref 65–100)
GLUCOSE UR STRIP.AUTO-MCNC: NEGATIVE MG/DL
HCT VFR BLD AUTO: 50.8 % (ref 35–47)
HGB BLD-MCNC: 17 G/DL (ref 11.5–16)
HGB UR QL STRIP: NEGATIVE
IMM GRANULOCYTES # BLD AUTO: 0 K/UL (ref 0–0.04)
IMM GRANULOCYTES NFR BLD AUTO: 0 % (ref 0–0.5)
KETONES UR QL STRIP.AUTO: NEGATIVE MG/DL
LACTATE BLD-SCNC: 3.35 MMOL/L (ref 0.4–2)
LACTATE BLD-SCNC: 3.99 MMOL/L (ref 0.4–2)
LACTATE BLD-SCNC: 4.22 MMOL/L (ref 0.4–2)
LACTATE SERPL-SCNC: 2.2 MMOL/L (ref 0.4–2)
LACTATE SERPL-SCNC: 2.2 MMOL/L (ref 0.4–2)
LACTATE SERPL-SCNC: 3.4 MMOL/L (ref 0.4–2)
LEUKOCYTE ESTERASE UR QL STRIP.AUTO: NEGATIVE
LIPASE SERPL-CCNC: 30 U/L (ref 13–75)
LYMPHOCYTES # BLD: 0.4 K/UL (ref 0.8–3.5)
LYMPHOCYTES NFR BLD: 5 % (ref 12–49)
MCH RBC QN AUTO: 29.3 PG (ref 26–34)
MCHC RBC AUTO-ENTMCNC: 33.5 G/DL (ref 30–36.5)
MCV RBC AUTO: 87.4 FL (ref 80–99)
MONOCYTES # BLD: 0.2 K/UL (ref 0–1)
MONOCYTES NFR BLD: 3 % (ref 5–13)
NEUTS BAND NFR BLD MANUAL: 16 %
NEUTS SEG # BLD: 7.1 K/UL (ref 1.8–8)
NEUTS SEG NFR BLD: 74 % (ref 32–75)
NITRITE UR QL STRIP.AUTO: NEGATIVE
NRBC # BLD: 0 K/UL (ref 0–0.01)
NRBC BLD-RTO: 0 PER 100 WBC
PH UR STRIP: 5 (ref 5–8)
PLATELET # BLD AUTO: 191 K/UL (ref 150–400)
PMV BLD AUTO: 10.1 FL (ref 8.9–12.9)
POTASSIUM SERPL-SCNC: 3.6 MMOL/L (ref 3.5–5.1)
PROCALCITONIN SERPL-MCNC: 0.27 NG/ML
PROT SERPL-MCNC: 8 G/DL (ref 6.4–8.2)
PROT UR STRIP-MCNC: NEGATIVE MG/DL
RBC # BLD AUTO: 5.81 M/UL (ref 3.8–5.2)
RBC MORPH BLD: ABNORMAL
SARS-COV-2 RDRP RESP QL NAA+PROBE: NOT DETECTED
SODIUM SERPL-SCNC: 142 MMOL/L (ref 136–145)
SOURCE: NORMAL
SP GR UR REFRACTOMETRY: 1.01 (ref 1–1.03)
UROBILINOGEN UR QL STRIP.AUTO: 0.2 EU/DL (ref 0.2–1)
WBC # BLD AUTO: 7.9 K/UL (ref 3.6–11)

## 2023-12-14 PROCEDURE — 85025 COMPLETE CBC W/AUTO DIFF WBC: CPT

## 2023-12-14 PROCEDURE — 84145 PROCALCITONIN (PCT): CPT

## 2023-12-14 PROCEDURE — 83690 ASSAY OF LIPASE: CPT

## 2023-12-14 PROCEDURE — 83605 ASSAY OF LACTIC ACID: CPT

## 2023-12-14 PROCEDURE — 2060000000 HC ICU INTERMEDIATE R&B

## 2023-12-14 PROCEDURE — 71045 X-RAY EXAM CHEST 1 VIEW: CPT

## 2023-12-14 PROCEDURE — 36415 COLL VENOUS BLD VENIPUNCTURE: CPT

## 2023-12-14 PROCEDURE — 6360000002 HC RX W HCPCS: Performed by: PHYSICIAN ASSISTANT

## 2023-12-14 PROCEDURE — 87086 URINE CULTURE/COLONY COUNT: CPT

## 2023-12-14 PROCEDURE — 87804 INFLUENZA ASSAY W/OPTIC: CPT

## 2023-12-14 PROCEDURE — 80053 COMPREHEN METABOLIC PANEL: CPT

## 2023-12-14 PROCEDURE — 99285 EMERGENCY DEPT VISIT HI MDM: CPT

## 2023-12-14 PROCEDURE — 6360000004 HC RX CONTRAST MEDICATION: Performed by: RADIOLOGY

## 2023-12-14 PROCEDURE — 74177 CT ABD & PELVIS W/CONTRAST: CPT

## 2023-12-14 PROCEDURE — 2580000003 HC RX 258: Performed by: INTERNAL MEDICINE

## 2023-12-14 PROCEDURE — 6370000000 HC RX 637 (ALT 250 FOR IP): Performed by: INTERNAL MEDICINE

## 2023-12-14 PROCEDURE — 81002 URINALYSIS NONAUTO W/O SCOPE: CPT

## 2023-12-14 PROCEDURE — 96361 HYDRATE IV INFUSION ADD-ON: CPT

## 2023-12-14 PROCEDURE — 87635 SARS-COV-2 COVID-19 AMP PRB: CPT

## 2023-12-14 PROCEDURE — 1100000000 HC RM PRIVATE

## 2023-12-14 PROCEDURE — 85379 FIBRIN DEGRADATION QUANT: CPT

## 2023-12-14 PROCEDURE — 96374 THER/PROPH/DIAG INJ IV PUSH: CPT

## 2023-12-14 PROCEDURE — 2580000003 HC RX 258: Performed by: PHYSICIAN ASSISTANT

## 2023-12-14 PROCEDURE — 6360000002 HC RX W HCPCS: Performed by: INTERNAL MEDICINE

## 2023-12-14 RX ORDER — ACETAMINOPHEN 325 MG/1
650 TABLET ORAL EVERY 6 HOURS PRN
Status: DISCONTINUED | OUTPATIENT
Start: 2023-12-14 | End: 2023-12-16 | Stop reason: HOSPADM

## 2023-12-14 RX ORDER — ACETAMINOPHEN 650 MG/1
650 SUPPOSITORY RECTAL EVERY 6 HOURS PRN
Status: DISCONTINUED | OUTPATIENT
Start: 2023-12-14 | End: 2023-12-16 | Stop reason: HOSPADM

## 2023-12-14 RX ORDER — ENOXAPARIN SODIUM 100 MG/ML
40 INJECTION SUBCUTANEOUS DAILY
Status: DISCONTINUED | OUTPATIENT
Start: 2023-12-14 | End: 2023-12-16 | Stop reason: HOSPADM

## 2023-12-14 RX ORDER — SODIUM CHLORIDE 0.9 % (FLUSH) 0.9 %
5-40 SYRINGE (ML) INJECTION PRN
Status: DISCONTINUED | OUTPATIENT
Start: 2023-12-14 | End: 2023-12-16 | Stop reason: HOSPADM

## 2023-12-14 RX ORDER — 0.9 % SODIUM CHLORIDE 0.9 %
1000 INTRAVENOUS SOLUTION INTRAVENOUS ONCE
Status: COMPLETED | OUTPATIENT
Start: 2023-12-14 | End: 2023-12-14

## 2023-12-14 RX ORDER — SODIUM CHLORIDE 9 MG/ML
INJECTION, SOLUTION INTRAVENOUS PRN
Status: DISCONTINUED | OUTPATIENT
Start: 2023-12-14 | End: 2023-12-16 | Stop reason: HOSPADM

## 2023-12-14 RX ORDER — ONDANSETRON 2 MG/ML
4 INJECTION INTRAMUSCULAR; INTRAVENOUS
Status: COMPLETED | OUTPATIENT
Start: 2023-12-14 | End: 2023-12-14

## 2023-12-14 RX ORDER — SODIUM CHLORIDE 0.9 % (FLUSH) 0.9 %
5-40 SYRINGE (ML) INJECTION EVERY 12 HOURS SCHEDULED
Status: DISCONTINUED | OUTPATIENT
Start: 2023-12-14 | End: 2023-12-16 | Stop reason: HOSPADM

## 2023-12-14 RX ORDER — SODIUM CHLORIDE 9 MG/ML
INJECTION, SOLUTION INTRAVENOUS CONTINUOUS
Status: DISCONTINUED | OUTPATIENT
Start: 2023-12-14 | End: 2023-12-16 | Stop reason: HOSPADM

## 2023-12-14 RX ORDER — ONDANSETRON 2 MG/ML
4 INJECTION INTRAMUSCULAR; INTRAVENOUS EVERY 6 HOURS PRN
Status: DISCONTINUED | OUTPATIENT
Start: 2023-12-14 | End: 2023-12-14

## 2023-12-14 RX ADMIN — SODIUM CHLORIDE 1000 ML: 9 INJECTION, SOLUTION INTRAVENOUS at 11:38

## 2023-12-14 RX ADMIN — SODIUM CHLORIDE 1000 ML: 9 INJECTION, SOLUTION INTRAVENOUS at 14:49

## 2023-12-14 RX ADMIN — IOPAMIDOL 100 ML: 755 INJECTION, SOLUTION INTRAVENOUS at 12:44

## 2023-12-14 RX ADMIN — ONDANSETRON 4 MG: 2 INJECTION INTRAMUSCULAR; INTRAVENOUS at 12:33

## 2023-12-14 RX ADMIN — ENOXAPARIN SODIUM 40 MG: 100 INJECTION SUBCUTANEOUS at 19:24

## 2023-12-14 RX ADMIN — SODIUM CHLORIDE 1000 ML: 9 INJECTION, SOLUTION INTRAVENOUS at 14:50

## 2023-12-14 RX ADMIN — ACETAMINOPHEN 650 MG: 325 TABLET ORAL at 19:25

## 2023-12-14 RX ADMIN — SODIUM CHLORIDE: 9 INJECTION, SOLUTION INTRAVENOUS at 18:59

## 2023-12-14 ASSESSMENT — PAIN SCALES - GENERAL: PAINLEVEL_OUTOF10: 9

## 2023-12-14 ASSESSMENT — LIFESTYLE VARIABLES
HOW OFTEN DO YOU HAVE A DRINK CONTAINING ALCOHOL: NEVER
HOW MANY STANDARD DRINKS CONTAINING ALCOHOL DO YOU HAVE ON A TYPICAL DAY: PATIENT DOES NOT DRINK

## 2023-12-14 NOTE — ED PROVIDER NOTES
[facility] EMERGENCY DEPT  EMERGENCY DEPARTMENT ENCOUNTER       Pt Name: Jess Fair  MRN: [id number]  [address] [facility] [place] 1975  Date of evaluation: 12/14/2023  Provider: [place], [place]   PCP: Rich Flaherty MD  Note Started: 11:06 AM EST 12/14/23     CHIEF COMPLAINT       Chief Complaint   Patient presents with    Abdominal Pain     N/v/d that started this morning        HISTORY OF PRESENT ILLNESS: 1 or more elements      History From: Patient  HPI Limitations: None     Jess Fair is a 50 y.o. female who presents with her family with sudden onset of hot and cold chills associate with nausea, vomiting and diarrhea. Patient tells me \"I think I have the flu\". She tells me her symptoms started suddenly around 4 AM this morning. She does describe some belly pain but attributes that to the vomiting. Vomiting and diarrhea have been nonbilious and nonbloody. Past medical history is significant for Crohn's disease with past surgical history significant for colonic resection and bladder reconstruction secondary to fistula.  is present and tells me their son had similar symptoms yesterday. Nursing Notes were all reviewed and agreed with or any disagreements were addressed in the HPI. REVIEW OF SYSTEMS      Review of Systems   Constitutional:  Positive for chills. Gastrointestinal:  Positive for abdominal pain, diarrhea, nausea and vomiting. Positives and Pertinent negatives as per HPI.     PAST HISTORY     Past Medical History:  Past Medical History:   Diagnosis Date    Annual physical exam 10/10/2017    Asthma     Crohn disease ([address]) 10/10/2017    Crohn's disease ([address])     Depression     Elevated LFTs 10/10/2017    Environmental allergies     Fatty liver     Foot pain 10/10/2017    Gastrointestinal disorder     Crohns    Migraine     Osteoporosis screening 10/10/2017    Panic attacks     Screening for diabetes mellitus 10/10/2017    Screening for hyperlipidemia 10/10/2017    Screening for comfortably. Heart rate has improved to the upper 90s. IV fluids are infusing. Will continue to monitor. [EJ]   1413 Patient is sitting up in bed and smiles and seems to be feeling much better. Her neck is supple in all planes without meningismus. Heart rate hovers around 100. IV fluids continuing to infuse. CT of the abdomen pelvis is negative for acute process. She is sipping on ginger ale and eating crackers. If lactate and heart rate significantly improve, may be able to discharge. Patient prefers to go home but understands our concerns. Will continue to monitor. [EJ]   1549 Heart rate hovers in the low 100's. IV fluids continue to infuse. Repeat lactate is scheduled for 4 PM.  Patient denies any chest pain or shortness of breath. Case will be turned [facility] to [facility]. Must consider admission if lactic acidosis and tachycardia persists. [EJ]   9526 Sign out obtained   Disposition pending fluids and re-evaluation of vitals and lactic levels     Patient condition is stable  [TL]   1805 Patient's lactate continues to trend upward, given this and persistent tachycardia, will admit for further evaluation and care. [TL]   [id number] Patient has no leukocytosis and no fever, do suspect that dehydration from nausea, vomiting, and diarrhea contributing to this, will continue to hold empiric antibiotics at this time. [TL]   [id number] Spoke with hospitalist, Dr. Bassam Cisneros who accepted the patient for admission. Patient's condition is stable for admission. Shared decision making performed and care plan created together, discussed continued results with patient, diagnosis, and treatment plan including admission for which patient verbalizes understanding and agreement [TL]      ED Course User Index  [EJ] NIYA Ruiz  [TL] NIYA Rubin       Disposition Considerations (Tests not done, Shared Decision Making, Pt Expectation of Test or Tx.):      FINAL IMPRESSION     1. Lactic acidosis    2.  Viral

## 2023-12-15 LAB
ANION GAP BLD CALC-SCNC: 8 (ref 10–20)
BACTERIA SPEC CULT: NORMAL
BASE DEFICIT BLD-SCNC: 0.3 MMOL/L
CA-I BLD-MCNC: 1.28 MMOL/L (ref 1.12–1.32)
CHLORIDE BLD-SCNC: 110 MMOL/L (ref 100–108)
CO2 BLD-SCNC: 26 MMOL/L (ref 19–24)
CREAT UR-MCNC: 0.6 MG/DL (ref 0.6–1.3)
CRP SERPL-MCNC: 3.69 MG/DL (ref 0–0.6)
ERYTHROCYTE [DISTWIDTH] IN BLOOD BY AUTOMATED COUNT: 12.4 % (ref 11.5–14.5)
GLUCOSE BLD STRIP.AUTO-MCNC: 93 MG/DL (ref 74–106)
HCO3 BLDA-SCNC: 27 MMOL/L
HCT VFR BLD AUTO: 39.3 % (ref 35–47)
HGB BLD-MCNC: 12.7 G/DL (ref 11.5–16)
LACTATE BLD-SCNC: 0.79 MMOL/L (ref 0.4–2)
LACTATE SERPL-SCNC: 1.5 MMOL/L (ref 0.4–2)
MCH RBC QN AUTO: 28.9 PG (ref 26–34)
MCHC RBC AUTO-ENTMCNC: 32.3 G/DL (ref 30–36.5)
MCV RBC AUTO: 89.5 FL (ref 80–99)
NRBC # BLD: 0 K/UL (ref 0–0.01)
NRBC BLD-RTO: 0 PER 100 WBC
PCO2 BLDV: 51.1 MMHG (ref 41–51)
PH BLDV: 7.32 (ref 7.32–7.42)
PLATELET # BLD AUTO: 137 K/UL (ref 150–400)
PMV BLD AUTO: 10 FL (ref 8.9–12.9)
PO2 BLDV: 33 MMHG (ref 25–40)
POTASSIUM BLD-SCNC: 3.5 MMOL/L (ref 3.5–5.5)
RBC # BLD AUTO: 4.39 M/UL (ref 3.8–5.2)
SAO2 % BLD: 58 %
SERVICE CMNT-IMP: NORMAL
SODIUM BLD-SCNC: 144 MMOL/L (ref 136–145)
SPECIMEN SITE: ABNORMAL
WBC # BLD AUTO: 2.8 K/UL (ref 3.6–11)

## 2023-12-15 PROCEDURE — 84295 ASSAY OF SERUM SODIUM: CPT

## 2023-12-15 PROCEDURE — 84132 ASSAY OF SERUM POTASSIUM: CPT

## 2023-12-15 PROCEDURE — 6360000002 HC RX W HCPCS: Performed by: INTERNAL MEDICINE

## 2023-12-15 PROCEDURE — 1100000003 HC PRIVATE W/ TELEMETRY

## 2023-12-15 PROCEDURE — 83993 ASSAY FOR CALPROTECTIN FECAL: CPT

## 2023-12-15 PROCEDURE — 2580000003 HC RX 258: Performed by: INTERNAL MEDICINE

## 2023-12-15 PROCEDURE — 83605 ASSAY OF LACTIC ACID: CPT

## 2023-12-15 PROCEDURE — 82330 ASSAY OF CALCIUM: CPT

## 2023-12-15 PROCEDURE — 6370000000 HC RX 637 (ALT 250 FOR IP): Performed by: NURSE PRACTITIONER

## 2023-12-15 PROCEDURE — 36415 COLL VENOUS BLD VENIPUNCTURE: CPT

## 2023-12-15 PROCEDURE — 85027 COMPLETE CBC AUTOMATED: CPT

## 2023-12-15 PROCEDURE — 82803 BLOOD GASES ANY COMBINATION: CPT

## 2023-12-15 PROCEDURE — 6370000000 HC RX 637 (ALT 250 FOR IP): Performed by: INTERNAL MEDICINE

## 2023-12-15 PROCEDURE — 86140 C-REACTIVE PROTEIN: CPT

## 2023-12-15 PROCEDURE — 87040 BLOOD CULTURE FOR BACTERIA: CPT

## 2023-12-15 PROCEDURE — 82947 ASSAY GLUCOSE BLOOD QUANT: CPT

## 2023-12-15 RX ORDER — CIPROFLOXACIN 2 MG/ML
400 INJECTION, SOLUTION INTRAVENOUS EVERY 12 HOURS
Status: DISCONTINUED | OUTPATIENT
Start: 2023-12-15 | End: 2023-12-15

## 2023-12-15 RX ORDER — LANOLIN ALCOHOL/MO/W.PET/CERES
6 CREAM (GRAM) TOPICAL
Status: COMPLETED | OUTPATIENT
Start: 2023-12-15 | End: 2023-12-15

## 2023-12-15 RX ORDER — METRONIDAZOLE 500 MG/100ML
500 INJECTION, SOLUTION INTRAVENOUS EVERY 8 HOURS
Status: DISCONTINUED | OUTPATIENT
Start: 2023-12-15 | End: 2023-12-16 | Stop reason: HOSPADM

## 2023-12-15 RX ORDER — LEVOFLOXACIN 5 MG/ML
750 INJECTION, SOLUTION INTRAVENOUS EVERY 24 HOURS
Status: DISCONTINUED | OUTPATIENT
Start: 2023-12-15 | End: 2023-12-16 | Stop reason: HOSPADM

## 2023-12-15 RX ORDER — DIPHENHYDRAMINE HCL 25 MG
25 CAPSULE ORAL EVERY 6 HOURS PRN
Status: DISCONTINUED | OUTPATIENT
Start: 2023-12-15 | End: 2023-12-16 | Stop reason: HOSPADM

## 2023-12-15 RX ORDER — HYDROXYZINE HYDROCHLORIDE 25 MG/1
25 TABLET, FILM COATED ORAL
Status: COMPLETED | OUTPATIENT
Start: 2023-12-15 | End: 2023-12-15

## 2023-12-15 RX ORDER — ONDANSETRON 2 MG/ML
4 INJECTION INTRAMUSCULAR; INTRAVENOUS EVERY 6 HOURS PRN
Status: DISCONTINUED | OUTPATIENT
Start: 2023-12-15 | End: 2023-12-16 | Stop reason: HOSPADM

## 2023-12-15 RX ADMIN — SODIUM CHLORIDE: 9 INJECTION, SOLUTION INTRAVENOUS at 17:31

## 2023-12-15 RX ADMIN — ENOXAPARIN SODIUM 40 MG: 100 INJECTION SUBCUTANEOUS at 10:09

## 2023-12-15 RX ADMIN — METRONIDAZOLE 500 MG: 500 INJECTION, SOLUTION INTRAVENOUS at 11:22

## 2023-12-15 RX ADMIN — SODIUM CHLORIDE, PRESERVATIVE FREE 10 ML: 5 INJECTION INTRAVENOUS at 20:38

## 2023-12-15 RX ADMIN — DIPHENHYDRAMINE HYDROCHLORIDE 25 MG: 25 CAPSULE ORAL at 18:37

## 2023-12-15 RX ADMIN — ONDANSETRON 4 MG: 2 INJECTION INTRAMUSCULAR; INTRAVENOUS at 10:09

## 2023-12-15 RX ADMIN — METRONIDAZOLE 500 MG: 500 INJECTION, SOLUTION INTRAVENOUS at 18:44

## 2023-12-15 RX ADMIN — Medication 6 MG: at 03:35

## 2023-12-15 RX ADMIN — ONDANSETRON 4 MG: 2 INJECTION INTRAMUSCULAR; INTRAVENOUS at 17:38

## 2023-12-15 RX ADMIN — LEVOFLOXACIN 750 MG: 5 INJECTION, SOLUTION INTRAVENOUS at 12:48

## 2023-12-15 RX ADMIN — SODIUM CHLORIDE, PRESERVATIVE FREE 10 ML: 5 INJECTION INTRAVENOUS at 11:52

## 2023-12-15 RX ADMIN — ACETAMINOPHEN 650 MG: 325 TABLET ORAL at 20:54

## 2023-12-15 RX ADMIN — HYDROXYZINE HYDROCHLORIDE 25 MG: 25 TABLET, FILM COATED ORAL at 21:29

## 2023-12-15 ASSESSMENT — PAIN SCALES - GENERAL
PAINLEVEL_OUTOF10: 4
PAINLEVEL_OUTOF10: 7

## 2023-12-15 ASSESSMENT — PAIN DESCRIPTION - LOCATION
LOCATION: GENERALIZED
LOCATION: ABDOMEN

## 2023-12-15 NOTE — H&P
Hospitalist Admission Note    NAME:   Michelle Jain   : 1975   MRN: [id number]     Date/Time: 2023 9:25 PM    Patient PCP: Kasey Langston MD    ______________________________________________________________________  Given the patient's current clinical presentation, I have a high level of concern for decompensation if discharged from the emergency department. Complex decision making was performed, which includes reviewing the patient's available past medical records, laboratory results, and x-ray films. My assessment of this patient's clinical condition and my plan of care is as follows. Assessment / Plan:    Lactic acidosis  Dehydration  Diarrhea  Abdominal pain    Admit patient to stepdown  Start patient on IV fluid  Follow-up procalcitonin  Follow-up stool study  Follow-up stool for C. Difficile  CT abdomen showed no acute abnormality  GI consultation      History of depression  Continue home medication    History of asthma  Continue home nebulizer    History of chronic disease  GI consultation     Medical Decision Making:   I personally reviewed labs: CBC, BMP  I personally reviewed imaging: Chest x-ray  I personally reviewed EKG:  Toxic drug monitoring:   Discussed case with: ED provider. After discussion I am in agreement that acuity of patient's medical condition necessitates hospital stay.       Code Status: Full  DVT Prophylaxis: Lovenox  Baseline:     Subjective:   CHIEF COMPLAINT: Abdominal pain    HISTORY OF PRESENT ILLNESS:     Michelle Jain is a 50 y.o.  female with PMHx significant for asthma, history of chronic disease, history of depression, history of vitamin B12 deficiency presented to the hospital for evaluation of fever chills associated with nausea vomiting and diarrhea started since 4 AM, denies any bloody bowel movement, patient has history of chronic disease s/p colon resection, blood work was significant for elevated lactic acid, CT abdomen was done showed no Last 12 Encounters:   12/14/23 86.7 kg (191 lb 2.2 oz)   03/12/21 94.3 kg (208 lb)   02/26/19 85.7 kg (189 lb)         PHYSICAL EXAM:  General:    Alert, cooperative, appears stated age. Lungs:   CTA b/l. No wheezing or Rhonchi. No rales. Chest wall:  No tenderness. No accessory muscle use. Heart:   Regular  rhythm,  No  Murmur. No edema  Abdomen:   Soft, NT. ND  BS+  Extremities: No cyanosis. No clubbing,      Skin turgor normal, Radial dial pulse 2+. Capillary refill normal  Neurologic: No facial asymmetry. No aphasia or slurred speech. Symmetrical strength, Sensation grossly intact. AAOx4.          LAB DATA REVIEWED:    Recent Results (from the past 12 hour(s))   CBC with Diff    Collection Time: 12/14/23 11:19 AM   Result Value Ref Range    WBC 7.9 3.6 - 11.0 K/uL    RBC 5.81 (H) 3.80 - 5.20 M/uL    Hemoglobin 17.0 (H) 11.5 - 16.0 g/dL    Hematocrit 50.8 (H) 35.0 - 47.0 %    MCV 87.4 80.0 - 99.0 FL    MCH 29.3 26.0 - 34.0 PG    MCHC 33.5 30.0 - 36.5 g/dL    RDW 12.3 11.5 - 14.5 %    Platelets 316 398 - 151 K/uL    MPV 10.1 8.9 - 12.9 FL    Nucleated RBCs 0.0 0  WBC    nRBC 0.00 0.00 - 0.01 K/uL    Neutrophils % 74 32 - 75 %    Band Neutrophils 16 %    Lymphocytes % 5 (L) 12 - 49 %    Monocytes % 3 (L) 5 - 13 %    Eosinophils % 1 0 - 7 %    Basophils % 1 0 - 1 %    Immature Granulocytes 0 0.0 - 0.5 %    Neutrophils Absolute 7.1 1.8 - 8.0 K/UL    Lymphocytes Absolute 0.4 (L) 0.8 - 3.5 K/UL    Monocytes Absolute 0.2 0.0 - 1.0 K/UL    Eosinophils Absolute 0.1 0.0 - 0.4 K/UL    Basophils Absolute 0.1 0.0 - 0.1 K/UL    Absolute Immature Granulocyte 0.0 0.00 - 0.04 K/UL    Differential Type MANUAL      RBC Comment NORMOCYTIC, NORMOCHROMIC     CMP    Collection Time: 12/14/23 11:19 AM   Result Value Ref Range    Sodium 142 136 - 145 mmol/L    Potassium 3.6 3.5 - 5.1 mmol/L    Chloride 110 (H) 97 - 108 mmol/L    CO2 22 21 - 32 mmol/L    Anion Gap 10 5 - 15 mmol/L    Glucose 143 (H) 65 - 100 mg/dL    BUN

## 2023-12-15 NOTE — ED NOTES
Verbal shift change report given to Lizbeth Person (oncoming nurse) by Vlad Larson RN (offgoing nurse). Report included the following information Nurse Handoff Report, Index, ED Encounter Summary, ED SBAR, Adult Overview, MAR, Recent Results, Med Rec Status, Cardiac Rhythm  , and Neuro Assessment. Oncoming RN aware of need for Levaquin. Pt is alert and oriented x4 at this time, ambulatory to restroom this morning. Pt requesting to wear her own pajamas.       Katie Donohue RN  12/15/23 [address] [facility], Vlad Larson, [place]  12/15/23 0881

## 2023-12-15 NOTE — PROGRESS NOTES
Hospitalist Progress Note    NAME:   Sho Garcia   : 1975   MRN: [id number]     Date/Time: 12/15/2023 8:14 AM  Patient PCP: Eun Carty MD    Estimated discharge date: 1 to 2 days  Barriers: Clinical improvement, GI clearance      Assessment / Plan:  Lactic acidosis  Dehydration  Diarrhea, Abdominal pain  History of Crohn's disease  Admit patient to stepdown  Start patient on IV fluid  Follow-up procalcitonin  Follow-up stool study  Follow-up stool for C. Difficile  CT abdomen showed no acute abnormality  GI consultation  12/15: Patient reports that her symptoms are somewhat controlled. Still having nausea and some diarrhea. Will check stool for C. difficile. She reports fever with chills as well. Will collect 2 sets of blood cultures. Started on IV Levaquin and IV Flagyl. Lactic acid is down to 1.5 this morning. Depression  Continue home medication     Asthma  Continue home nebulizer           Medical Decision Making:   I personally reviewed labs[de-identified] CBC, BMP, lactic acid, procalcitonin  I personally reviewed imaging:  I personally reviewed EKG:  Toxic drug monitoring:   Discussed case with: Patient, RN        Code Status: Full code  DVT Prophylaxis: Lovenox  GI Prophylaxis:    Subjective:     Chief Complaint / Reason for Physician Visit  \" Follow-up for diarrhea, abdominal pain, dehydration and repeat lactic acidosis, asthma, depression. \". Discussed with RN events overnight. Objective:     VITALS:   Last 24hrs VS reviewed since prior progress note.  Most recent are:  Patient Vitals for the past 24 hrs:   BP Temp Temp src Pulse Resp SpO2 Weight   12/15/23 0700 (!) 96/55 -- -- 75 21 93 % --   12/15/23 0600 (!) 90/56 -- -- 76 22 92 % --   12/15/23 0500 98/64 -- -- 75 20 93 % --   12/15/23 0400 (!) 95/56 -- -- 88 21 94 % --   12/15/23 0330 103/83 -- -- 71 22 96 % --   12/15/23 0200 (!) 100/59 -- -- 77 20 95 % --   12/15/23 0130 106/76 -- -- 73 22 94 % --   12/15/23 0100 107/69 -- 110*   CO2 22   GLUCOSE 143*   BUN 17   CREATININE 0.87   CALCIUM 9.3   LABALBU 4.0   BILITOT 0.9   AST 25   ALT 37       Signed: Giovanni John MD

## 2023-12-15 NOTE — ED NOTES
Pt requesting to use the restroom, ambulatory to restroom at this time. RN changed bed linens, pt asked to stay in her pajamas rather than wear gown. Reports feeling tired w/ body aches. Pt is alert and oriented, compliant w/ plan of care at this time.      Key Michel RN  12/15/23 [id number]

## 2023-12-15 NOTE — CONSULTS
Gay Swift, NP-C                       [phone] cell              Monday-Thursday 7:30 am-4:30 pm                     Friday 7:30 am-12:00 pm     Gastroenterology Consultation Note      Admit Date: 12/14/2023  Consult Date: 12/15/2023   I greatly appreciate your asking me to see Livia Joshi, thank you very much for the opportunity to participate in her care. Narrative Assessment and Plan   Ms. Dank Palmer is a 49-year-old female with past medical history of Crohn disease, fatty liver, asthma, and depression, being evaluated for N/V/D starting early yesterday morning. On 12/13/23, her son starting vomiting after they ate out at [facility], and she took care of him before developing N/V/D. Multiple children in his class at school have been sick with similar symptoms as well. Last EGD/colonoscopy 7/21/2020 with Dr. Mikala Garrido showing no active Crohn Disease. EGD with minimal gastritis, relaxed LES, minimal erosive esophagitis. She is s/p CCY 3/2023. CT negative. Stool studies pending. Impression:  Nausea/vomiting/diarrhea  Crohn Disease  Fatty liver  Asthma  Depression    Plan:  -Follow for stool studies ordered, add norovirus and rotavirus. Likely viral gastroenteritis, family member with similar symptoms.   -continue with supportive care: IV fluids, oral fluids as tolerated  -Recommend CLD for now, progress as tolerated  -patient would like to get established with a provider at the [facility]. Will plan for OP follow up once discharged. Subjective:     Chief Complaint: Nausea, Vomiting, and diarrhea    History of Present Illness: Ms. Dank Palmer is a 49-year-old female with past medical history of Crohn Disease diagnosed in 1999 by Dr. Cletis Essex, fatty liver, asthma, and depression, being evaluated for nausea, vomiting, and diarrhea.  Her son developed these symptoms while they were out shopping on 12/13/23 after eating 2.00 mmol/L   Urinalysis    Collection Time: 12/14/23  2:02 PM   Result Value Ref Range    Color, UA YELLOW/STRAW      Appearance CLEAR CLEAR      Specific Gravity, UA 1.015 1.003 - 1.030      pH, Urine 5.0 5.0 - 8.0      Protein, UA Negative NEG mg/dL    Glucose, UA Negative NEG mg/dL    Ketones, Urine Negative NEG mg/dL    Bilirubin Urine Negative NEG      Blood, Urine Negative NEG      Urobilinogen, Urine 0.2 0.2 - 1.0 EU/dL    Nitrite, Urine Negative NEG      Leukocyte Esterase, Urine Negative NEG     POC Lactic Acid    Collection Time: 12/14/23  4:33 PM   Result Value Ref Range    POC Lactic Acid 4.22 (HH) 0.40 - 2.00 mmol/L   Lactic Acid    Collection Time: 12/14/23  4:59 PM   Result Value Ref Range    Lactic Acid, Plasma 3.4 (HH) 0.4 - 2.0 MMOL/L   Lactate, Sepsis    Collection Time: 12/14/23  6:57 PM   Result Value Ref Range    Lactic Acid, Sepsis 2.2 (HH) 0.4 - 2.0 MMOL/L   Procalcitonin    Collection Time: 12/14/23  6:57 PM   Result Value Ref Range    Procalcitonin 0.27 ng/mL   D-Dimer, Quantitative    Collection Time: 12/14/23  6:57 PM   Result Value Ref Range    D-Dimer, Quant 1.19 (H) 0.00 - 0.65 mg/L FEU   Lactic Acid    Collection Time: 12/14/23 10:19 PM   Result Value Ref Range    Lactic Acid, Plasma 2.2 (HH) 0.4 - 2.0 MMOL/L   Lactate, Sepsis    Collection Time: 12/15/23  2:27 AM   Result Value Ref Range    Lactic Acid, Sepsis 1.5 0.4 - 2.0 MMOL/L         Assessment/Plan:     Principal Problem:    Sepsis ([address])  Resolved Problems:    * No resolved hospital problems. *       See above narrative for full detail.     MARYLIN Cortes - NP

## 2023-12-16 VITALS
DIASTOLIC BLOOD PRESSURE: 70 MMHG | HEART RATE: 86 BPM | RESPIRATION RATE: 16 BRPM | BODY MASS INDEX: 35.17 KG/M2 | TEMPERATURE: 98 F | OXYGEN SATURATION: 97 % | SYSTOLIC BLOOD PRESSURE: 120 MMHG | HEIGHT: 62 IN | WEIGHT: 191.14 LBS

## 2023-12-16 LAB
ALBUMIN SERPL-MCNC: 2.6 G/DL (ref 3.5–5)
ALBUMIN/GLOB SERPL: 0.9 (ref 1.1–2.2)
ALP SERPL-CCNC: 55 U/L (ref 45–117)
ALT SERPL-CCNC: 29 U/L (ref 12–78)
ANION GAP SERPL CALC-SCNC: 5 MMOL/L (ref 5–15)
AST SERPL-CCNC: 18 U/L (ref 15–37)
BILIRUB SERPL-MCNC: 0.6 MG/DL (ref 0.2–1)
BUN SERPL-MCNC: 8 MG/DL (ref 6–20)
BUN/CREAT SERPL: 14 (ref 12–20)
C COLI+JEJUNI TUF STL QL NAA+PROBE: NEGATIVE
CALCIUM SERPL-MCNC: 8 MG/DL (ref 8.5–10.1)
CHLORIDE SERPL-SCNC: 115 MMOL/L (ref 97–108)
CO2 SERPL-SCNC: 24 MMOL/L (ref 21–32)
CREAT SERPL-MCNC: 0.57 MG/DL (ref 0.55–1.02)
EC STX1+STX2 GENES STL QL NAA+PROBE: NEGATIVE
ERYTHROCYTE [DISTWIDTH] IN BLOOD BY AUTOMATED COUNT: 12.2 % (ref 11.5–14.5)
ETEC ELTA+ESTB GENES STL QL NAA+PROBE: NEGATIVE
GLOBULIN SER CALC-MCNC: 2.8 G/DL (ref 2–4)
GLUCOSE SERPL-MCNC: 103 MG/DL (ref 65–100)
HCT VFR BLD AUTO: 37.3 % (ref 35–47)
HGB BLD-MCNC: 12.3 G/DL (ref 11.5–16)
LACTATE SERPL-SCNC: 0.8 MMOL/L (ref 0.4–2)
MAGNESIUM SERPL-MCNC: 1.6 MG/DL (ref 1.6–2.4)
MCH RBC QN AUTO: 29.3 PG (ref 26–34)
MCHC RBC AUTO-ENTMCNC: 33 G/DL (ref 30–36.5)
MCV RBC AUTO: 88.8 FL (ref 80–99)
NRBC # BLD: 0 K/UL (ref 0–0.01)
NRBC BLD-RTO: 0 PER 100 WBC
P SHIGELLOIDES DNA STL QL NAA+PROBE: NEGATIVE
PLATELET # BLD AUTO: 132 K/UL (ref 150–400)
PMV BLD AUTO: 9.4 FL (ref 8.9–12.9)
POTASSIUM SERPL-SCNC: 3.3 MMOL/L (ref 3.5–5.1)
PROT SERPL-MCNC: 5.4 G/DL (ref 6.4–8.2)
RBC # BLD AUTO: 4.2 M/UL (ref 3.8–5.2)
RV AG STL QL IA: NEGATIVE
SALMONELLA SP SPAO STL QL NAA+PROBE: NEGATIVE
SHIGELLA SP+EIEC IPAH STL QL NAA+PROBE: NEGATIVE
SODIUM SERPL-SCNC: 144 MMOL/L (ref 136–145)
V CHOL+PARA+VUL DNA STL QL NAA+NON-PROBE: NEGATIVE
WBC # BLD AUTO: 2.2 K/UL (ref 3.6–11)
Y ENTEROCOL DNA STL QL NAA+NON-PROBE: NEGATIVE

## 2023-12-16 PROCEDURE — 87506 IADNA-DNA/RNA PROBE TQ 6-11: CPT

## 2023-12-16 PROCEDURE — 36415 COLL VENOUS BLD VENIPUNCTURE: CPT

## 2023-12-16 PROCEDURE — 89055 LEUKOCYTE ASSESSMENT FECAL: CPT

## 2023-12-16 PROCEDURE — 87324 CLOSTRIDIUM AG IA: CPT

## 2023-12-16 PROCEDURE — 80053 COMPREHEN METABOLIC PANEL: CPT

## 2023-12-16 PROCEDURE — 87425 ROTAVIRUS AG IA: CPT

## 2023-12-16 PROCEDURE — 87449 NOS EACH ORGANISM AG IA: CPT

## 2023-12-16 PROCEDURE — 6360000002 HC RX W HCPCS: Performed by: INTERNAL MEDICINE

## 2023-12-16 PROCEDURE — 6370000000 HC RX 637 (ALT 250 FOR IP): Performed by: INTERNAL MEDICINE

## 2023-12-16 PROCEDURE — 83735 ASSAY OF MAGNESIUM: CPT

## 2023-12-16 PROCEDURE — 83605 ASSAY OF LACTIC ACID: CPT

## 2023-12-16 PROCEDURE — 2580000003 HC RX 258: Performed by: INTERNAL MEDICINE

## 2023-12-16 PROCEDURE — 85027 COMPLETE CBC AUTOMATED: CPT

## 2023-12-16 RX ORDER — MAGNESIUM SULFATE 1 G/100ML
1000 INJECTION INTRAVENOUS
Status: DISCONTINUED | OUTPATIENT
Start: 2023-12-16 | End: 2023-12-16

## 2023-12-16 RX ORDER — POTASSIUM CHLORIDE 20 MEQ/1
40 TABLET, EXTENDED RELEASE ORAL
Status: DISCONTINUED | OUTPATIENT
Start: 2023-12-16 | End: 2023-12-16

## 2023-12-16 RX ORDER — POTASSIUM CHLORIDE 20 MEQ/1
40 TABLET, EXTENDED RELEASE ORAL
Status: DISPENSED | OUTPATIENT
Start: 2023-12-16 | End: 2023-12-16

## 2023-12-16 RX ORDER — MAGNESIUM SULFATE 1 G/100ML
1000 INJECTION INTRAVENOUS
Status: DISPENSED | OUTPATIENT
Start: 2023-12-16 | End: 2023-12-16

## 2023-12-16 RX ADMIN — ENOXAPARIN SODIUM 40 MG: 100 INJECTION SUBCUTANEOUS at 09:31

## 2023-12-16 RX ADMIN — METRONIDAZOLE 500 MG: 500 INJECTION, SOLUTION INTRAVENOUS at 09:25

## 2023-12-16 RX ADMIN — LEVOFLOXACIN 750 MG: 5 INJECTION, SOLUTION INTRAVENOUS at 11:14

## 2023-12-16 RX ADMIN — POTASSIUM CHLORIDE 40 MEQ: 1500 TABLET, EXTENDED RELEASE ORAL at 09:31

## 2023-12-16 RX ADMIN — SODIUM CHLORIDE, PRESERVATIVE FREE 10 ML: 5 INJECTION INTRAVENOUS at 09:31

## 2023-12-16 RX ADMIN — MAGNESIUM SULFATE HEPTAHYDRATE 1000 MG: 1 INJECTION, SOLUTION INTRAVENOUS at 09:30

## 2023-12-16 RX ADMIN — METRONIDAZOLE 500 MG: 500 INJECTION, SOLUTION INTRAVENOUS at 01:01

## 2023-12-16 NOTE — CARE COORDINATION
Care Management Initial Assessment       RUR: 8% Low Risk  Readmission? No  1st IM letter given? N/A  1st  letter given: N/A      Initial Assessment: Chart reviewed. CM met with pt at the bedside to introduce self and role. Verified contact information and demographics. Pt resides with spouse ( Grover Boudreaux [phone]) in a one level home with 5 SAMUEL. Pt PCP is Dr. Shannon Jara with last visit being over a year ago due to having trouble being able to schedule appt. Preferred pharmacy is [facility] on [address] . Pt has no hx needing HH/IPR/SNF services. Pt independent with ADL's. Pt is  an active  and spouse will pick her up for discharge. ?She?states there is no concerns for her to return home. CM will continue to follow. Full assessment below:            12/16/23 4404   Service Assessment   Patient Orientation Alert and Oriented;Person;Place;Situation;Self   Cognition Alert   History Provided By Patient   Primary Caregiver Self   Support Systems Spouse/Significant Other   Patient's Healthcare Decision Maker is: Legal Next of [facility]  (Spouse (Grover Boudreaux [phone]))   PCP Verified by CM Yes   Last Visit to PCP Within last year   Prior Functional Level Independent in ADLs/IADLs; Bathing;Dressing; Toileting;Feeding;Cooking;Housework; Shopping;Mobility   Current Functional Level Independent in ADLs/IADLs; Bathing;Dressing; Toileting;Feeding;Cooking;Housework; Shopping;Mobility   Can patient return to prior living arrangement Yes   Social/Functional History   Lives With Spouse   Type of [address] [place] [address] Center  One level   Home Equipment None   Active  Yes   Discharge Planning   Type of Residence House   Current Services Prior To Admission None   Patient expects to be discharged to: [address] ([facility]) Conversation    Date of Conversation: 12/14/2023  Conducted with: Patient with Decision Making Capacity    Healthcare Decision Maker:  No healthcare

## 2023-12-16 NOTE — PLAN OF CARE
Problem: Discharge Planning  Goal: Discharge to home or other facility with appropriate resources  12/15/2023 2354 by Kayla Riojas RN  Outcome: Progressing  12/15/2023 1403 by GREG Ross  Outcome: Progressing     Problem: Pain  Goal: Verbalizes/displays adequate comfort level or baseline comfort level  Outcome: Progressing

## 2023-12-16 NOTE — PROGRESS NOTES
Hospitalist Progress Note    NAME:   Telma Luu   : 1975   MRN: [id number]     Date/Time: 2023 8:07 AM  Patient PCP: Manolo Brady MD    Estimated discharge date: 1 to 2 days  Barriers: Clinical improvement, GI clearance      Assessment / Plan:  Lactic acidosis  Dehydration  Diarrhea, Abdominal pain  History of Crohn's disease  Admit patient to stepdown  Start patient on IV fluid  Follow-up procalcitonin  Follow-up stool study  Follow-up stool for C. Difficile  CT abdomen showed no acute abnormality  GI consultation  12/15: Patient reports that her symptoms are somewhat controlled. Still having nausea and some diarrhea. Will check stool for C. difficile. She reports fever with chills as well. Will collect 2 sets of blood cultures. Started on IV Levaquin and IV Flagyl. Lactic acid is down to 1.5 this morning. : Patient still reports being nauseous, diffuse abdominal pain, watery diarrhea. We still need to collect sample to rule out C. difficile colitis. No fever or chills overnight. Blood cultures negative so far. Lactic acid is 0.8. Blood pressure came up nicely and this morning it is 121/73. Heart rate also normalized. Continue IV antibiotics. Collect stool sample. Reevaluate in the morning. Leukopenia  Thrombocytopenia  : White count is 2.2 which is trending down. Platelet also trending down at 132. Likely result of hemodilution. At the same time to related to infection. Monitor CBC.      Depression  Continue home medication     Asthma  Continue home nebulizer           Medical Decision Making:   I personally reviewed labs[de-identified] CBC, BMP, lactic acid, blood culture  I personally reviewed imaging:  I personally reviewed EKG:  Toxic drug monitoring:   Discussed case with: Patient, RN        Code Status: Full code  DVT Prophylaxis: Lovenox  GI Prophylaxis:    Subjective:     Chief Complaint / Reason for Physician Visit  \" Follow-up for diarrhea, abdominal pain,

## 2023-12-16 NOTE — PROGRESS NOTES
1748: pt walked out of room saying \"thanks for the wonderful care, you suck as a nurse\". Pt left hospital AMA w/o signing out. Dr. Chan Lorenz was informed. 1753: pt is walking back onto unit, speaks with Gayathri Olson, [facility]: pt is leaving hospital, again, AMA and without signing out. Dr. Chan Lorenz is informed again  Pt c/o not seeing nurse since 11am. Pt was rounded on q2h  Pt left unit, removed IV access by herself prior to leaving the unit.

## 2023-12-17 LAB
BACTERIA SPEC CULT: NORMAL
BACTERIA SPEC CULT: NORMAL
SERVICE CMNT-IMP: NORMAL
SERVICE CMNT-IMP: NORMAL
WBC #/AREA STL HPF: NORMAL /HPF (ref 0–4)

## 2023-12-21 NOTE — DISCHARGE SUMMARY
Discharge Summary    Name: Serg Arita  [id number]  YOB: 1975 (Age: 50 y.o.)   Date of Admission: 12/14/2023  Date of Discharge: 12/21/2023  Attending Physician: No att. providers found    Discharge Diagnosis:   Lactic acidosis  Dehydration  Diarrhea, Abdominal pain  History of Crohn's disease  Leukopenia  Thrombocytopenia  Depression  Asthma        Consultations:  IP CONSULT TO HOSPITALIST  IP CONSULT TO GI      Brief Admission History/Reason for Admission Per Angeles Valle MD: Serg Arita is a 50 y.o.  female with PMHx significant for asthma, history of chronic disease, history of depression, history of vitamin B12 deficiency presented to the hospital for evaluation of fever chills associated with nausea vomiting and diarrhea started since 4 AM, denies any bloody bowel movement, patient has history of chronic disease s/p colon resection, blood work was significant for elevated lactic acid, CT abdomen was done showed no acute abnormality. We were asked to admit for work up and evaluation of the above problems. Brief Hospital Course by Main Problems:   Lactic acidosis  Dehydration  Diarrhea, Abdominal pain  History of Crohn's disease  Admit patient to stepdown  Start patient on IV fluid  Follow-up procalcitonin  Follow-up stool study  Follow-up stool for C. Difficile  CT abdomen showed no acute abnormality  GI consultation  12/15: Patient reports that her symptoms are somewhat controlled. Still having nausea and some diarrhea. Will check stool for C. difficile. She reports fever with chills as well. Will collect 2 sets of blood cultures. Started on IV Levaquin and IV Flagyl. Lactic acid is down to 1.5 this morning. 12/16: Patient still reports being nauseous, diffuse abdominal pain, watery diarrhea. We still need to collect sample to rule out C. difficile colitis. No fever or chills overnight. Blood cultures negative so far. Lactic acid is 0.8.

## 2024-01-18 ENCOUNTER — TELEPHONE (OUTPATIENT)
Age: 49
End: 2024-01-18

## 2024-07-10 SDOH — ECONOMIC STABILITY: FOOD INSECURITY: WITHIN THE PAST 12 MONTHS, THE FOOD YOU BOUGHT JUST DIDN'T LAST AND YOU DIDN'T HAVE MONEY TO GET MORE.: SOMETIMES TRUE

## 2024-07-10 SDOH — ECONOMIC STABILITY: FOOD INSECURITY: WITHIN THE PAST 12 MONTHS, YOU WORRIED THAT YOUR FOOD WOULD RUN OUT BEFORE YOU GOT MONEY TO BUY MORE.: NEVER TRUE

## 2024-07-10 SDOH — ECONOMIC STABILITY: HOUSING INSECURITY
IN THE LAST 12 MONTHS, WAS THERE A TIME WHEN YOU DID NOT HAVE A STEADY PLACE TO SLEEP OR SLEPT IN A SHELTER (INCLUDING NOW)?: NO

## 2024-07-10 SDOH — ECONOMIC STABILITY: INCOME INSECURITY: HOW HARD IS IT FOR YOU TO PAY FOR THE VERY BASICS LIKE FOOD, HOUSING, MEDICAL CARE, AND HEATING?: NOT VERY HARD

## 2024-07-10 SDOH — ECONOMIC STABILITY: TRANSPORTATION INSECURITY
IN THE PAST 12 MONTHS, HAS LACK OF TRANSPORTATION KEPT YOU FROM MEETINGS, WORK, OR FROM GETTING THINGS NEEDED FOR DAILY LIVING?: NO

## 2024-07-11 ENCOUNTER — OFFICE VISIT (OUTPATIENT)
Age: 49
End: 2024-07-11
Payer: COMMERCIAL

## 2024-07-11 VITALS
SYSTOLIC BLOOD PRESSURE: 110 MMHG | HEART RATE: 81 BPM | RESPIRATION RATE: 18 BRPM | OXYGEN SATURATION: 98 % | DIASTOLIC BLOOD PRESSURE: 76 MMHG | HEIGHT: 62 IN | TEMPERATURE: 97.2 F | BODY MASS INDEX: 38.35 KG/M2 | WEIGHT: 208.4 LBS

## 2024-07-11 DIAGNOSIS — J45.20 MILD INTERMITTENT ASTHMA WITHOUT COMPLICATION: Primary | ICD-10-CM

## 2024-07-11 DIAGNOSIS — K21.9 GASTROESOPHAGEAL REFLUX DISEASE WITHOUT ESOPHAGITIS: ICD-10-CM

## 2024-07-11 DIAGNOSIS — K76.0 NON-ALCOHOLIC FATTY LIVER DISEASE: ICD-10-CM

## 2024-07-11 DIAGNOSIS — K44.9 HIATAL HERNIA: ICD-10-CM

## 2024-07-11 DIAGNOSIS — E66.01 SEVERE OBESITY (BMI 35.0-39.9) WITH COMORBIDITY (HCC): ICD-10-CM

## 2024-07-11 DIAGNOSIS — E55.9 VITAMIN D DEFICIENCY: ICD-10-CM

## 2024-07-11 DIAGNOSIS — F41.1 GAD (GENERALIZED ANXIETY DISORDER): ICD-10-CM

## 2024-07-11 DIAGNOSIS — K50.813 CROHN'S DISEASE OF BOTH SMALL AND LARGE INTESTINE WITH FISTULA (HCC): ICD-10-CM

## 2024-07-11 DIAGNOSIS — E78.00 HIGH CHOLESTEROL: ICD-10-CM

## 2024-07-11 DIAGNOSIS — Z13.1 SCREENING FOR DIABETES MELLITUS: ICD-10-CM

## 2024-07-11 PROCEDURE — 99215 OFFICE O/P EST HI 40 MIN: CPT | Performed by: INTERNAL MEDICINE

## 2024-07-11 RX ORDER — PANTOPRAZOLE SODIUM 40 MG/1
40 TABLET, DELAYED RELEASE ORAL
Qty: 90 TABLET | Refills: 1 | Status: SHIPPED | OUTPATIENT
Start: 2024-07-11

## 2024-07-11 RX ORDER — MONTELUKAST SODIUM 10 MG/1
10 TABLET ORAL DAILY
Qty: 30 TABLET | Refills: 1 | Status: SHIPPED | OUTPATIENT
Start: 2024-07-11

## 2024-07-11 RX ORDER — FAMOTIDINE 40 MG/1
40 TABLET, FILM COATED ORAL EVERY EVENING
Qty: 30 TABLET | Refills: 3 | Status: SHIPPED | OUTPATIENT
Start: 2024-07-11

## 2024-07-11 RX ORDER — ALBUTEROL SULFATE 90 UG/1
2 AEROSOL, METERED RESPIRATORY (INHALATION) EVERY 4 HOURS PRN
Qty: 18 G | Refills: 1 | Status: SHIPPED | OUTPATIENT
Start: 2024-07-11

## 2024-07-11 RX ORDER — BUDESONIDE AND FORMOTEROL FUMARATE DIHYDRATE 160; 4.5 UG/1; UG/1
2 AEROSOL RESPIRATORY (INHALATION) 2 TIMES DAILY
Qty: 10.2 G | Refills: 1 | Status: SHIPPED | OUTPATIENT
Start: 2024-07-11

## 2024-07-11 RX ORDER — DULOXETIN HYDROCHLORIDE 30 MG/1
30 CAPSULE, DELAYED RELEASE ORAL DAILY
Qty: 30 CAPSULE | Refills: 3 | Status: SHIPPED | OUTPATIENT
Start: 2024-07-11

## 2024-07-11 ASSESSMENT — PATIENT HEALTH QUESTIONNAIRE - PHQ9
3. TROUBLE FALLING OR STAYING ASLEEP: NOT AT ALL
10. IF YOU CHECKED OFF ANY PROBLEMS, HOW DIFFICULT HAVE THESE PROBLEMS MADE IT FOR YOU TO DO YOUR WORK, TAKE CARE OF THINGS AT HOME, OR GET ALONG WITH OTHER PEOPLE: NOT DIFFICULT AT ALL
6. FEELING BAD ABOUT YOURSELF - OR THAT YOU ARE A FAILURE OR HAVE LET YOURSELF OR YOUR FAMILY DOWN: NOT AT ALL
4. FEELING TIRED OR HAVING LITTLE ENERGY: NOT AT ALL
SUM OF ALL RESPONSES TO PHQ QUESTIONS 1-9: 0
SUM OF ALL RESPONSES TO PHQ QUESTIONS 1-9: 0
1. LITTLE INTEREST OR PLEASURE IN DOING THINGS: NOT AT ALL
9. THOUGHTS THAT YOU WOULD BE BETTER OFF DEAD, OR OF HURTING YOURSELF: NOT AT ALL
SUM OF ALL RESPONSES TO PHQ9 QUESTIONS 1 & 2: 0
8. MOVING OR SPEAKING SO SLOWLY THAT OTHER PEOPLE COULD HAVE NOTICED. OR THE OPPOSITE, BEING SO FIGETY OR RESTLESS THAT YOU HAVE BEEN MOVING AROUND A LOT MORE THAN USUAL: NOT AT ALL
5. POOR APPETITE OR OVEREATING: NOT AT ALL
SUM OF ALL RESPONSES TO PHQ QUESTIONS 1-9: 0
SUM OF ALL RESPONSES TO PHQ QUESTIONS 1-9: 0
2. FEELING DOWN, DEPRESSED OR HOPELESS: NOT AT ALL
7. TROUBLE CONCENTRATING ON THINGS, SUCH AS READING THE NEWSPAPER OR WATCHING TELEVISION: NOT AT ALL

## 2024-07-11 NOTE — PROGRESS NOTES
\"Have you been to the ER, urgent care clinic since your last visit?  Hospitalized since your last visit?\"    ER beginning of July ([facility])    “Have you seen or consulted any other health care providers outside of [facility] since your last visit?”    Yes, [place] [facility] beginning of July for hernia pain   “Have you had a pap smear?”    NO    No cervical cancer screening on file             Click Here for Release of Records Request

## 2024-07-11 NOTE — PROGRESS NOTES
Tanika Prajapati (:  1975) is a 48 y.o. female, Established patient, here for evaluation of the following chief complaint(s):  Hiatal Hernia (Went to ER two Saturday's ago)         Assessment & Plan  1. Epigastric pain and bloating.  The symptoms suggest severe acid reflux, similar to GERD. Hiatal hernias are common, with the only way to perform an accurate grading is an upper endoscopy. The bitter taste in her mouth is likely due to acid reflux. Protonix, an acid-blocking agent, will be prescribed, to be taken in the morning 30 minutes before meals. Pepcid will be taken at night, approximately 8 hours apart. A referral to a gastroenterologist will be made. She is advised to undergo a colonoscopy every 2 years due to her Crohn's disease. She should avoid late-night eating and never lie down after eating. Blood work, including electrolytes, sed rate, CRP, selenium, ceruloplasmin, iron, zinc, B12, vitamin D, B6, sed rate, and CRP, will be ordered.    2. Depression and anxiety.  Cymbalta 30 mg will be reintroduced. A therapist is recommended.    3. Asthma.  Singulair, budesonide inhaler, and albuterol will be renewed.    Follow-up  A follow-up visit is scheduled in 3 months.    Results  Imaging  CAT scan showed no Crohn's flare-up but indicated a hiatal hernia. Fatty liver was found.  1. Mild intermittent asthma without complication  -     albuterol sulfate HFA (PROVENTIL;VENTOLIN;PROAIR) 108 (90 Base) MCG/ACT inhaler; Inhale 2 puffs into the lungs every 4 hours as needed for Wheezing, Disp-18 g, R-1Normal  -     budesonide-formoterol (SYMBICORT) 160-4.5 MCG/ACT AERO; Inhale 2 puffs into the lungs 2 times daily, Disp-10.2 g, R-1Normal  -     montelukast (SINGULAIR) 10 MG tablet; Take 1 tablet by mouth daily, Disp-30 tablet, R-1Normal  2. Non-alcoholic fatty liver disease  -     Comprehensive Metabolic Panel; Future  -     CBC with Auto Differential; Future  3. Vitamin D deficiency  4. Severe obesity (BMI

## 2024-07-11 NOTE — PATIENT INSTRUCTIONS
Recommend [facility] counseling for therapy  Resume Cymbalta    Start pantoprazole in the morning 30 minutes before eating  Famotidine at night  Avoid eating 3 hours prior to bedtime  Scheduled appointment with Dr Garcia

## 2024-07-12 ENCOUNTER — LAB (OUTPATIENT)
Age: 49
End: 2024-07-12

## 2024-07-12 DIAGNOSIS — Z13.1 SCREENING FOR DIABETES MELLITUS: ICD-10-CM

## 2024-07-12 DIAGNOSIS — K50.813 CROHN'S DISEASE OF BOTH SMALL AND LARGE INTESTINE WITH FISTULA (HCC): ICD-10-CM

## 2024-07-12 DIAGNOSIS — K76.0 NON-ALCOHOLIC FATTY LIVER DISEASE: ICD-10-CM

## 2024-07-12 DIAGNOSIS — E78.00 HIGH CHOLESTEROL: ICD-10-CM

## 2024-07-13 LAB
25(OH)D3+25(OH)D2 SERPL-MCNC: 19.2 NG/ML (ref 30–100)
ALBUMIN SERPL-MCNC: 4.5 G/DL (ref 3.9–4.9)
ALP SERPL-CCNC: 113 IU/L (ref 44–121)
ALT SERPL-CCNC: 43 IU/L (ref 0–32)
AST SERPL-CCNC: 32 IU/L (ref 0–40)
BASOPHILS # BLD AUTO: 0 X10E3/UL (ref 0–0.2)
BASOPHILS NFR BLD AUTO: 1 %
BILIRUB SERPL-MCNC: 0.6 MG/DL (ref 0–1.2)
BUN SERPL-MCNC: 10 MG/DL (ref 6–24)
BUN/CREAT SERPL: 15 (ref 9–23)
CALCIUM SERPL-MCNC: 9.6 MG/DL (ref 8.7–10.2)
CERULOPLASMIN SERPL-MCNC: 23.1 MG/DL (ref 19–39)
CHLORIDE SERPL-SCNC: 105 MMOL/L (ref 96–106)
CHOLEST SERPL-MCNC: 187 MG/DL (ref 100–199)
CO2 SERPL-SCNC: 24 MMOL/L (ref 20–29)
CREAT SERPL-MCNC: 0.67 MG/DL (ref 0.57–1)
CRP SERPL-MCNC: 1 MG/L (ref 0–10)
EGFRCR SERPLBLD CKD-EPI 2021: 108 ML/MIN/1.73
EOSINOPHIL # BLD AUTO: 0.2 X10E3/UL (ref 0–0.4)
EOSINOPHIL NFR BLD AUTO: 4 %
ERYTHROCYTE [DISTWIDTH] IN BLOOD BY AUTOMATED COUNT: 13.1 % (ref 11.7–15.4)
ERYTHROCYTE [SEDIMENTATION RATE] IN BLOOD BY WESTERGREN METHOD: 2 MM/HR (ref 0–32)
GLOBULIN SER CALC-MCNC: 2.4 G/DL (ref 1.5–4.5)
GLUCOSE SERPL-MCNC: 90 MG/DL (ref 70–99)
HBA1C MFR BLD: 5.6 % (ref 4.8–5.6)
HCT VFR BLD AUTO: 44.7 % (ref 34–46.6)
HDLC SERPL-MCNC: 48 MG/DL
HGB BLD-MCNC: 14.8 G/DL (ref 11.1–15.9)
IMM GRANULOCYTES # BLD AUTO: 0 X10E3/UL (ref 0–0.1)
IMM GRANULOCYTES NFR BLD AUTO: 0 %
IRON SATN MFR SERPL: 23 % (ref 15–55)
IRON SERPL-MCNC: 103 UG/DL (ref 27–159)
LDLC SERPL CALC-MCNC: 89 MG/DL (ref 0–99)
LYMPHOCYTES # BLD AUTO: 1.3 X10E3/UL (ref 0.7–3.1)
LYMPHOCYTES NFR BLD AUTO: 36 %
MCH RBC QN AUTO: 28.6 PG (ref 26.6–33)
MCHC RBC AUTO-ENTMCNC: 33.1 G/DL (ref 31.5–35.7)
MCV RBC AUTO: 87 FL (ref 79–97)
MONOCYTES # BLD AUTO: 0.3 X10E3/UL (ref 0.1–0.9)
MONOCYTES NFR BLD AUTO: 9 %
NEUTROPHILS # BLD AUTO: 1.8 X10E3/UL (ref 1.4–7)
NEUTROPHILS NFR BLD AUTO: 50 %
PLATELET # BLD AUTO: 191 X10E3/UL (ref 150–450)
POTASSIUM SERPL-SCNC: 4.5 MMOL/L (ref 3.5–5.2)
PROT SERPL-MCNC: 6.9 G/DL (ref 6–8.5)
RBC # BLD AUTO: 5.17 X10E6/UL (ref 3.77–5.28)
SODIUM SERPL-SCNC: 143 MMOL/L (ref 134–144)
TIBC SERPL-MCNC: 453 UG/DL (ref 250–450)
TRIGL SERPL-MCNC: 303 MG/DL (ref 0–149)
UIBC SERPL-MCNC: 350 UG/DL (ref 131–425)
VIT B12 SERPL-MCNC: 530 PG/ML (ref 232–1245)
VLDLC SERPL CALC-MCNC: 50 MG/DL (ref 5–40)
WBC # BLD AUTO: 3.7 X10E3/UL (ref 3.4–10.8)

## 2024-07-16 LAB
SELENIUM SERPL-MCNC: 145 UG/L (ref 93–198)
VIT A SERPL-MCNC: 72.4 UG/DL (ref 20.1–62)
ZINC SERPL-MCNC: 72 UG/DL (ref 44–115)

## 2024-07-17 LAB — PYRIDOXAL PHOS SERPL-MCNC: 12.3 UG/L (ref 3.4–65.2)

## 2024-07-24 ENCOUNTER — TELEPHONE (OUTPATIENT)
Age: 49
End: 2024-07-24

## 2024-07-24 NOTE — TELEPHONE ENCOUNTER
----- Message from Kellie Sánchez MD sent at 7/23/2024  5:09 PM EDT -----  Regarding: FW: Labs  Contact: [phone]  can you call and discuss labs with patient.  ----- Message -----  From: Marie Sanchez MA  Sent: 7/22/2024   1:27 PM EDT  To: Kellie Sánchez MD  Subject: FW: Labs                                           ----- Message -----  From: Tanika Prajapati  Sent: 7/22/2024   1:22 PM EDT  To: #  Subject: Labs                                             I'm not taking any Vitamin A, so why is it so high?  Iron, I'm not taking any supplements.   As for the Vitamin D, my body will not absorb the pills.      I don't feel like you all are taking this seriously at all.

## 2024-07-31 ENCOUNTER — TELEPHONE (OUTPATIENT)
Age: 49
End: 2024-07-31

## 2024-07-31 NOTE — TELEPHONE ENCOUNTER
----- Message from Kellie Sánchez MD sent at 7/23/2024  5:09 PM EDT -----  Regarding: FW: Labs  Contact: [phone]  can you call and discuss labs with patient.  ----- Message -----  From: Marie Sanchez MA  Sent: 7/22/2024   1:27 PM EDT  To: Kellie Sánchez MD  Subject: FW: Labs                                           ----- Message -----  From: Tanika Prajapati  Sent: 7/22/2024   1:22 PM EDT  To: #  Subject: Labs                                             I'm not taking any Vitamin A, so why is it so high?  Iron, I'm not taking any supplements.   As for the Vitamin D, my body will not absorb the pills.      I don't feel like you all are taking this seriously at all.

## 2024-08-21 ENCOUNTER — OFFICE VISIT (OUTPATIENT)
Age: 49
End: 2024-08-21

## 2024-08-21 VITALS
SYSTOLIC BLOOD PRESSURE: 117 MMHG | HEART RATE: 78 BPM | DIASTOLIC BLOOD PRESSURE: 79 MMHG | BODY MASS INDEX: 38.41 KG/M2 | WEIGHT: 210 LBS | TEMPERATURE: 98.4 F | OXYGEN SATURATION: 96 %

## 2024-08-21 DIAGNOSIS — H66.002 NON-RECURRENT ACUTE SUPPURATIVE OTITIS MEDIA OF LEFT EAR WITHOUT SPONTANEOUS RUPTURE OF TYMPANIC MEMBRANE: Primary | ICD-10-CM

## 2024-08-21 DIAGNOSIS — F41.1 GAD (GENERALIZED ANXIETY DISORDER): ICD-10-CM

## 2024-08-21 DIAGNOSIS — E55.9 VITAMIN D DEFICIENCY: ICD-10-CM

## 2024-08-21 DIAGNOSIS — J45.20 MILD INTERMITTENT ASTHMA WITHOUT COMPLICATION: ICD-10-CM

## 2024-08-21 RX ORDER — AMOXICILLIN AND CLAVULANATE POTASSIUM 875; 125 MG/1; MG/1
1 TABLET, FILM COATED ORAL 2 TIMES DAILY
Qty: 20 TABLET | Refills: 0 | Status: SHIPPED | OUTPATIENT
Start: 2024-08-21 | End: 2024-08-31

## 2024-08-21 RX ORDER — DICYCLOMINE HCL 20 MG
20 TABLET ORAL EVERY 6 HOURS
COMMUNITY
Start: 2024-06-29

## 2024-08-21 RX ORDER — ERGOCALCIFEROL 1.25 MG/1
50000 CAPSULE ORAL WEEKLY
Qty: 12 CAPSULE | Refills: 1 | Status: SHIPPED | OUTPATIENT
Start: 2024-08-21

## 2024-08-21 RX ORDER — ALBUTEROL SULFATE 90 UG/1
2 AEROSOL, METERED RESPIRATORY (INHALATION) EVERY 4 HOURS PRN
Qty: 18 G | Refills: 1 | Status: SHIPPED | OUTPATIENT
Start: 2024-08-21

## 2024-08-21 RX ORDER — MONTELUKAST SODIUM 10 MG/1
10 TABLET ORAL DAILY
Qty: 30 TABLET | Refills: 1 | Status: SHIPPED | OUTPATIENT
Start: 2024-08-21

## 2024-08-21 RX ORDER — BUDESONIDE AND FORMOTEROL FUMARATE DIHYDRATE 160; 4.5 UG/1; UG/1
2 AEROSOL RESPIRATORY (INHALATION) 2 TIMES DAILY
Qty: 10.2 G | Refills: 1 | Status: SHIPPED | OUTPATIENT
Start: 2024-08-21

## 2024-08-21 RX ORDER — DULOXETIN HYDROCHLORIDE 30 MG/1
30 CAPSULE, DELAYED RELEASE ORAL DAILY
Qty: 30 CAPSULE | Refills: 3 | Status: SHIPPED | OUTPATIENT
Start: 2024-08-21

## 2024-08-21 NOTE — TELEPHONE ENCOUNTER
PCP: Kellie Esquivel MD    Last appt:   7/11/2024    Future Appointments   Date Time Provider Department Center   10/15/2024  3:30 PM Kellie Esquivel MD [facility]       Requested Prescriptions     Pending Prescriptions Disp Refills    budesonide-formoterol (SYMBICORT) 160-4.5 MCG/ACT AERO 10.2 g 1     Sig: Inhale 2 puffs into the lungs 2 times daily    albuterol sulfate HFA (PROVENTIL;VENTOLIN;PROAIR) 108 (90 Base) MCG/ACT inhaler 18 g 1     Sig: Inhale 2 puffs into the lungs every 4 hours as needed for Wheezing    DULoxetine (CYMBALTA) 30 MG extended release capsule 30 capsule 3     Sig: Take 1 capsule by mouth daily    vitamin D (ERGOCALCIFEROL) 1.25 MG (81674 UT) CAPS capsule 12 capsule 1     Sig: Take 1 capsule by mouth once a week    montelukast (SINGULAIR) 10 MG tablet 30 tablet 1     Sig: Take 1 tablet by mouth daily

## 2024-08-21 NOTE — PROGRESS NOTES
Tanika Prajapati (:  1975) is a 48 y.o. female,New patient, here for evaluation of the following chief complaint(s):  Otalgia (Left ear pain since this morning)      Assessment & Plan :  Visit Diagnoses and Associated Orders       Non-recurrent acute suppurative otitis media of left ear without spontaneous rupture of tympanic membrane    -  Primary    amoxicillin-clavulanate (AUGMENTIN) 875-125 MG per tablet [46345]      [facility] - Dorothea Kennedy MD, [facility] [[id number]]           ORDERS WITHOUT AN ASSOCIATED DIAGNOSIS    dicyclomine (BENTYL) 20 MG tablet [2420]            History and physical today is consistent with an acute otitis media  I do not see any evidence of TM perforation at this time  Will treat with Augmentin, twice daily for 10 days  Warm compresses to the ear for comfort  Tylenol over-the-counter as needed for pain  Please monitor your symptoms carefully, if symptoms persist please follow-up with an ENT doctor, referral provided         Subjective :    Otalgia          48 y.o. female presents with symptoms of left ear pain since last night.  She had reported some ear fullness, nasal congestion, postnasal drip and cough for the past several days.  When she was coughing last night she felt a sharp pop in the left ear followed by some diminished hearing.  She denies any drainage or discharge from the ear.  She denies fevers, chills, aches or pains.  She has been swimming recently, states that she was at the pool last weekend.  She reports excruciating pain and tenderness in the ear along with ongoing diminished hearing sensation.  Denies any allergies to antibiotics.         Vitals:    24 1658   BP: 117/79   Site: Right Upper Arm   Position: Sitting   Cuff Size: Medium Adult   Pulse: 78   Temp: 98.4 °F (36.9 °C)   SpO2: 96%   Weight: 95.3 kg (210 lb)       No results found for this visit on 24.      Objective   Physical Exam  Vitals and nursing note reviewed.

## 2024-08-21 NOTE — PATIENT INSTRUCTIONS
History and physical today is consistent with an acute otitis media  I do not see any evidence of TM perforation at this time  Will treat with Augmentin, twice daily for 10 days  Warm compresses to the ear for comfort  Tylenol over-the-counter as needed for pain  Please monitor your symptoms carefully, if symptoms persist please follow-up with an ENT doctor, referral provided